# Patient Record
Sex: MALE | Race: WHITE | NOT HISPANIC OR LATINO | URBAN - METROPOLITAN AREA
[De-identification: names, ages, dates, MRNs, and addresses within clinical notes are randomized per-mention and may not be internally consistent; named-entity substitution may affect disease eponyms.]

---

## 2019-10-13 ENCOUNTER — INPATIENT (INPATIENT)
Facility: HOSPITAL | Age: 64
LOS: 3 days | Discharge: HOME | DRG: 65 | End: 2019-10-17
Attending: PSYCHIATRY & NEUROLOGY | Admitting: PSYCHIATRY & NEUROLOGY
Payer: COMMERCIAL

## 2019-10-13 VITALS
HEIGHT: 69 IN | RESPIRATION RATE: 19 BRPM | HEART RATE: 83 BPM | TEMPERATURE: 98 F | OXYGEN SATURATION: 96 % | WEIGHT: 251.99 LBS | DIASTOLIC BLOOD PRESSURE: 111 MMHG | SYSTOLIC BLOOD PRESSURE: 154 MMHG

## 2019-10-13 DIAGNOSIS — I10 ESSENTIAL (PRIMARY) HYPERTENSION: ICD-10-CM

## 2019-10-13 DIAGNOSIS — Z91.89 OTHER SPECIFIED PERSONAL RISK FACTORS, NOT ELSEWHERE CLASSIFIED: ICD-10-CM

## 2019-10-13 DIAGNOSIS — E11.9 TYPE 2 DIABETES MELLITUS WITHOUT COMPLICATIONS: ICD-10-CM

## 2019-10-13 DIAGNOSIS — I48.91 UNSPECIFIED ATRIAL FIBRILLATION: ICD-10-CM

## 2019-10-13 DIAGNOSIS — R63.8 OTHER SYMPTOMS AND SIGNS CONCERNING FOOD AND FLUID INTAKE: ICD-10-CM

## 2019-10-13 DIAGNOSIS — I63.9 CEREBRAL INFARCTION, UNSPECIFIED: ICD-10-CM

## 2019-10-13 DIAGNOSIS — E78.5 HYPERLIPIDEMIA, UNSPECIFIED: ICD-10-CM

## 2019-10-13 LAB
ALBUMIN SERPL ELPH-MCNC: 3.9 G/DL — SIGNIFICANT CHANGE UP (ref 3.4–5)
ALP SERPL-CCNC: 89 U/L — SIGNIFICANT CHANGE UP (ref 40–120)
ALT FLD-CCNC: 69 U/L — HIGH (ref 12–42)
AMPHET UR-MCNC: NEGATIVE — SIGNIFICANT CHANGE UP
ANION GAP SERPL CALC-SCNC: 14 MMOL/L — SIGNIFICANT CHANGE UP (ref 9–16)
APTT BLD: 31.5 SEC — SIGNIFICANT CHANGE UP (ref 27.5–36.3)
AST SERPL-CCNC: 36 U/L — SIGNIFICANT CHANGE UP (ref 15–37)
BARBITURATES UR SCN-MCNC: NEGATIVE — SIGNIFICANT CHANGE UP
BASOPHILS NFR BLD AUTO: 0.7 % — SIGNIFICANT CHANGE UP (ref 0–2)
BENZODIAZ UR-MCNC: NEGATIVE — SIGNIFICANT CHANGE UP
BILIRUB SERPL-MCNC: 0.7 MG/DL — SIGNIFICANT CHANGE UP (ref 0.2–1.2)
BUN SERPL-MCNC: 17 MG/DL — SIGNIFICANT CHANGE UP (ref 7–23)
CALCIUM SERPL-MCNC: 9.3 MG/DL — SIGNIFICANT CHANGE UP (ref 8.5–10.5)
CHLORIDE SERPL-SCNC: 107 MMOL/L — SIGNIFICANT CHANGE UP (ref 96–108)
CO2 SERPL-SCNC: 22 MMOL/L — SIGNIFICANT CHANGE UP (ref 22–31)
COCAINE METAB.OTHER UR-MCNC: NEGATIVE — SIGNIFICANT CHANGE UP
CREAT SERPL-MCNC: 1 MG/DL — SIGNIFICANT CHANGE UP (ref 0.5–1.3)
EOSINOPHIL NFR BLD AUTO: 2.9 % — SIGNIFICANT CHANGE UP (ref 0–6)
ETHANOL SERPL-MCNC: <3 MG/DL — SIGNIFICANT CHANGE UP
GLUCOSE SERPL-MCNC: 122 MG/DL — HIGH (ref 70–99)
HCT VFR BLD CALC: 45.2 % — SIGNIFICANT CHANGE UP (ref 39–50)
HGB BLD-MCNC: 15 G/DL — SIGNIFICANT CHANGE UP (ref 13–17)
IMM GRANULOCYTES NFR BLD AUTO: 0.1 % — SIGNIFICANT CHANGE UP (ref 0–1.5)
INR BLD: 1.09 — SIGNIFICANT CHANGE UP (ref 0.88–1.16)
LYMPHOCYTES # BLD AUTO: 36 % — SIGNIFICANT CHANGE UP (ref 13–44)
MCHC RBC-ENTMCNC: 27.8 PG — SIGNIFICANT CHANGE UP (ref 27–34)
MCHC RBC-ENTMCNC: 33.2 G/DL — SIGNIFICANT CHANGE UP (ref 32–36)
MCV RBC AUTO: 83.9 FL — SIGNIFICANT CHANGE UP (ref 80–100)
METHADONE UR-MCNC: NEGATIVE — SIGNIFICANT CHANGE UP
MONOCYTES NFR BLD AUTO: 9.4 % — SIGNIFICANT CHANGE UP (ref 2–14)
NEUTROPHILS NFR BLD AUTO: 50.9 % — SIGNIFICANT CHANGE UP (ref 43–77)
OPIATES UR-MCNC: NEGATIVE — SIGNIFICANT CHANGE UP
PCP SPEC-MCNC: SIGNIFICANT CHANGE UP
PCP UR-MCNC: NEGATIVE — SIGNIFICANT CHANGE UP
PLATELET # BLD AUTO: 322 K/UL — SIGNIFICANT CHANGE UP (ref 150–400)
POTASSIUM SERPL-MCNC: 3.9 MMOL/L — SIGNIFICANT CHANGE UP (ref 3.5–5.3)
POTASSIUM SERPL-SCNC: 3.9 MMOL/L — SIGNIFICANT CHANGE UP (ref 3.5–5.3)
PROT SERPL-MCNC: 8.2 G/DL — SIGNIFICANT CHANGE UP (ref 6.4–8.2)
PROTHROM AB SERPL-ACNC: 12 SEC — SIGNIFICANT CHANGE UP (ref 10–12.9)
RBC # BLD: 5.39 M/UL — SIGNIFICANT CHANGE UP (ref 4.2–5.8)
RBC # FLD: 16.4 % — HIGH (ref 10.3–14.5)
SODIUM SERPL-SCNC: 143 MMOL/L — SIGNIFICANT CHANGE UP (ref 132–145)
THC UR QL: NEGATIVE — SIGNIFICANT CHANGE UP
TROPONIN I SERPL-MCNC: <0.017 NG/ML — LOW (ref 0.02–0.06)
WBC # BLD: 9.1 K/UL — SIGNIFICANT CHANGE UP (ref 3.8–10.5)
WBC # FLD AUTO: 9.1 K/UL — SIGNIFICANT CHANGE UP (ref 3.8–10.5)

## 2019-10-13 PROCEDURE — 71045 X-RAY EXAM CHEST 1 VIEW: CPT | Mod: 26

## 2019-10-13 PROCEDURE — 93010 ELECTROCARDIOGRAM REPORT: CPT

## 2019-10-13 PROCEDURE — 70496 CT ANGIOGRAPHY HEAD: CPT | Mod: 26

## 2019-10-13 PROCEDURE — 70450 CT HEAD/BRAIN W/O DYE: CPT | Mod: 26,59

## 2019-10-13 PROCEDURE — 0042T: CPT | Mod: 26

## 2019-10-13 PROCEDURE — 70498 CT ANGIOGRAPHY NECK: CPT | Mod: 26

## 2019-10-13 PROCEDURE — 99291 CRITICAL CARE FIRST HOUR: CPT

## 2019-10-13 RX ORDER — CLOPIDOGREL BISULFATE 75 MG/1
75 TABLET, FILM COATED ORAL ONCE
Refills: 0 | Status: COMPLETED | OUTPATIENT
Start: 2019-10-13 | End: 2019-10-13

## 2019-10-13 RX ORDER — SODIUM CHLORIDE 9 MG/ML
1000 INJECTION, SOLUTION INTRAVENOUS
Refills: 0 | Status: DISCONTINUED | OUTPATIENT
Start: 2019-10-13 | End: 2019-10-17

## 2019-10-13 RX ORDER — DEXTROSE 50 % IN WATER 50 %
12.5 SYRINGE (ML) INTRAVENOUS ONCE
Refills: 0 | Status: DISCONTINUED | OUTPATIENT
Start: 2019-10-13 | End: 2019-10-17

## 2019-10-13 RX ORDER — ATORVASTATIN CALCIUM 80 MG/1
80 TABLET, FILM COATED ORAL AT BEDTIME
Refills: 0 | Status: DISCONTINUED | OUTPATIENT
Start: 2019-10-13 | End: 2019-10-17

## 2019-10-13 RX ORDER — ASPIRIN/CALCIUM CARB/MAGNESIUM 324 MG
81 TABLET ORAL EVERY 24 HOURS
Refills: 0 | Status: DISCONTINUED | OUTPATIENT
Start: 2019-10-14 | End: 2019-10-14

## 2019-10-13 RX ORDER — DEXTROSE 50 % IN WATER 50 %
25 SYRINGE (ML) INTRAVENOUS ONCE
Refills: 0 | Status: DISCONTINUED | OUTPATIENT
Start: 2019-10-13 | End: 2019-10-17

## 2019-10-13 RX ORDER — ENOXAPARIN SODIUM 100 MG/ML
40 INJECTION SUBCUTANEOUS EVERY 24 HOURS
Refills: 0 | Status: DISCONTINUED | OUTPATIENT
Start: 2019-10-13 | End: 2019-10-14

## 2019-10-13 RX ORDER — INSULIN LISPRO 100/ML
VIAL (ML) SUBCUTANEOUS
Refills: 0 | Status: DISCONTINUED | OUTPATIENT
Start: 2019-10-13 | End: 2019-10-14

## 2019-10-13 RX ORDER — GLUCAGON INJECTION, SOLUTION 0.5 MG/.1ML
1 INJECTION, SOLUTION SUBCUTANEOUS ONCE
Refills: 0 | Status: DISCONTINUED | OUTPATIENT
Start: 2019-10-13 | End: 2019-10-17

## 2019-10-13 RX ORDER — ASPIRIN/CALCIUM CARB/MAGNESIUM 324 MG
81 TABLET ORAL ONCE
Refills: 0 | Status: COMPLETED | OUTPATIENT
Start: 2019-10-13 | End: 2019-10-13

## 2019-10-13 RX ORDER — CLOPIDOGREL BISULFATE 75 MG/1
75 TABLET, FILM COATED ORAL EVERY 24 HOURS
Refills: 0 | Status: DISCONTINUED | OUTPATIENT
Start: 2019-10-14 | End: 2019-10-14

## 2019-10-13 RX ORDER — DEXTROSE 50 % IN WATER 50 %
15 SYRINGE (ML) INTRAVENOUS ONCE
Refills: 0 | Status: DISCONTINUED | OUTPATIENT
Start: 2019-10-13 | End: 2019-10-17

## 2019-10-13 RX ADMIN — CLOPIDOGREL BISULFATE 75 MILLIGRAM(S): 75 TABLET, FILM COATED ORAL at 19:52

## 2019-10-13 RX ADMIN — Medication 81 MILLIGRAM(S): at 19:55

## 2019-10-13 NOTE — ED PROVIDER NOTE - CLINICAL SUMMARY MEDICAL DECISION MAKING FREE TEXT BOX
62 yo male with type II DM, HTN, HLD presents with ataxia since this afternoon and slowed responsiveness as per the family.  Denies slurred speech, headache, dizziness.  On arrival BP elevated, no other VS derangements.  Stroke called on arrival.  Imaging shows acute/subacute L parietal CVA.  NIHSS 0.  Reviewed with Dr. Brad Parker, plavix ordered.  While in room on monitor found to be in afib without any cardiac history and currently asymptomatic.  Labs reviewed.  Urine/BAL pending.  Denies alcohol use today.  [place]ives in the [place], visiting his son here in [place].  Admit to [facility].

## 2019-10-13 NOTE — ED PROVIDER NOTE - CROS ED NEURO NEG
no dizziness, no changes in speech/no headache no change in level of consciousness/no dizziness, no changes in speech/no headache

## 2019-10-13 NOTE — H&P ADULT - PROBLEM SELECTOR PLAN 2
-per attending no AC at this time   -assess tomorrow with stroke team -per attending no AC at this time   -lopressor 5 IV prn for rate goal <110  -assess tomorrow with stroke team need for AC

## 2019-10-13 NOTE — H&P ADULT - NSHPPHYSICALEXAM_GEN_ALL_CORE
VITAL SIGNS:  T(C): 36.8 (10-13-19 @ 21:27), Max: 36.8 (10-13-19 @ 21:27)  T(F): 98.3 (10-13-19 @ 21:27), Max: 98.3 (10-13-19 @ 21:27)  HR: 124 (10-13-19 @ 21:27) (83 - 127)  BP: 132/104 (10-13-19 @ 21:27) (132/104 - 154/111)  BP(mean): --  RR: 18 (10-13-19 @ 21:27) (18 - 19)  SpO2: 96% (10-13-19 @ 21:27) (96% - 97%)  Wt(kg): --    PHYSICAL EXAM:    Constitutional: WDWN, lying comfortably in bed, NAD  Head: Nc/At  Eyes: PERRL, EOMI, clear conjunctiva  ENT: no nasal discharge; uvula midline, no oropharyngeal erythema or exudates; MMM  Neck: supple; no JVD or thyromegaly  Respiratory: CTA b/l, no wheezes, rales, or rhonchi  Cardiac: +S1/S2, +RRR, no murmurs, rubs, or gallops  Gastrointestinal: soft, non-tender, non-distended, no rebound/guarding, no palpable masses, normoactive bowel sounds x4  Back: spine midline, no bony tenderness or step-offs; no CVAT B/L  Extremities: WWP, no clubbing or cyanosis, no peripheral edema  Musculoskeletal: NROM x4; no joint swelling, tenderness or erythema  Vascular: 2+ radial and DP pulses b/l  Dermatologic: skin warm, dry and intact, no rashes, wounds, or scars  Lymphatic: no submandibular or cervical LAD  Neurologic: AAOx3, CNII-XII grossly intact, no focal deficits  Psychiatric: affect and characteristics of appearance, verbalizations, behaviors are appropriate PHYSICAL EXAM:    Constitutional: WDWN, lying comfortably in bed, NAD  Head: Nc/At  Eyes: PERRL, EOMI, clear conjunctiva  ENT: no nasal discharge; uvula midline, no oropharyngeal erythema or exudates; MMM  Neck: supple; no JVD or thyromegaly  Respiratory: CTA b/l, no wheezes, rales, or rhonchi  Cardiac: +S1/S2, +RRR, no murmurs, rubs, or gallops  Gastrointestinal: obese,soft, non-tender, non-distended, no rebound/guarding, no palpable masses, normoactive bowel sounds x4  Back: spine midline, no bony tenderness or step-offs; no CVAT B/L  Extremities: WWP, no clubbing or cyanosis, no peripheral edema  Musculoskeletal: NROM x4; no joint swelling, tenderness or erythema  Vascular: 2+ radial and DP pulses b/l  Dermatologic: skin warm, dry and intact, no rashes, wounds, or scars  Lymphatic: no submandibular or cervical LAD  Neurologic:     -Mental Status: AAOx3. Speech is fluent with intact naming, repetition, and comprehension, no dysarthria. Recent and remote memory intact. Follows commands. Attention/concentration intact. Fund of knowledge appropriate.     -Cranial Nerves:          II: Visual fields are full to confrontation.          III, IV, VI: EOMI without nystagmus. PERRL b/l          V:  Facial sensation V1-V3 equal and intact           VII: Face is symmetric with normal eye closure and smile          VIII: Hearing is bilaterally intact to finger rub          IX, X: Uvula is midline and soft palate rises symmetrically          XI: Head turning and shoulder shrug are intact.          XII: Tongue protrudes midline     -Motor: Normal bulk and tone. Strength bilateral upper extremity 5/5, bilateral lower extremities 5/5.                     No pronator drift. Rapid alternating movements intact and symmetric     -Sensation: Intact to light touch bilaterally. No neglect or extinction on double simultaneous testing.     -Coordination: No dysmetria on finger-to-nose and heel-to-shin bilaterally     -Reflexes: Downgoing toes bilaterally      -Gait: not observed

## 2019-10-13 NOTE — H&P ADULT - PROBLEM SELECTOR PLAN 6
F: none  E: replete K <4, Mg <2  N: DASH/TLC  PPI: not needed  DVT: lovenox, SCDs F: none  E: replete K <4, Mg <2  N: DASH/TLC, carb consistent; NPO after 12 for JESUS  PPI: not needed  DVT: lovenox, SCDs

## 2019-10-13 NOTE — H&P ADULT - HISTORY OF PRESENT ILLNESS
63 year old male PMH HTN, HLD, DM2, gout visiting from  presenting with ataxia since 2 pm and 5-10 minutes of unresponsiveness at that time. Per patient's family, patient has been delayed in speech and walking abnormally for 2-3 days.       ED vitals: T 98.0, /111, p 83, RR 19, 96% on RA  ED labs: Glucose 122, ALT 69  EKG: Afib, rate 129  Stroke code called; NIHSS 0, no tPA given   S/p aspirin 81, plavix 75  CT head:  < from: CT Brain Stroke Protocol (10.13.19 @ 19:14) >  IMPRESSION: Small area of hypodensity is noted in the right   temporoparietal region which is suspicious for acute/subacute infarction   involving the right MCA territory.  No acute intracranial hemorrhage.    CT perfusion: < from: CT Perfusion w/ Maps w/ IV Cont (10.13.19 @ 22:06) >  IMPRESSION: Abnormal perfusion with RAPID calculated mismatch volume of   32 ml and mismatch ratio is infinite, particularly involving the right   parietal lobe and left frontal lobe.    CTA: < from: CT Angio Neck w/ IV Cont (10.13.19 @ 19:47) >  IMPRESSION: No intracranial large vessel occlusion or high-grade stenosis   identified.    < end of copied text >  < from: CT Angio Neck w/ IV Cont (10.13.19 @ 19:47) >  1.  No extracranial large vessel occlusion, high-grade stenosis, or   evidence of dissection.  2.  Pulmonary hypertension. 63 year old male PMH HTN, HLD, DM2, gout visiting from  presenting with gait instability, blurred vision since this afternoon with 5-10 minutes of unresponsiveness at that time. Patient reports that since arriving on Tuesday he has felt dyspneic on exertion but states he does not exercise at home and has been doing a lot of walking here. Today he felt like his knee gave out and his family had to help him to his bench. He did not respond to his family at that time for 5-10 minutes. After that he noted blurred vision and head heaviness. No facial asymmetry noted, no dysarthria or word-finding difficulty, no palpitations, no chest pain or discomfort, no nausea/vomiting.       ED vitals: T 98.0, /111, p 83, RR 19, 96% on RA  ED labs: Glucose 122, ALT 69  EKG: Afib, rate 129  Stroke code called; NIHSS 0, no tPA given   S/p aspirin 81, plavix 75  CT head:  < from: CT Brain Stroke Protocol (10.13.19 @ 19:14) >  IMPRESSION: Small area of hypodensity is noted in the right   temporoparietal region which is suspicious for acute/subacute infarction   involving the right MCA territory.  No acute intracranial hemorrhage.    CT perfusion: < from: CT Perfusion w/ Maps w/ IV Cont (10.13.19 @ 22:06) >  IMPRESSION: Abnormal perfusion with RAPID calculated mismatch volume of   32 ml and mismatch ratio is infinite, particularly involving the right   parietal lobe and left frontal lobe.    CTA: < from: CT Angio Neck w/ IV Cont (10.13.19 @ 19:47) >  IMPRESSION: No intracranial large vessel occlusion or high-grade stenosis   identified.    < end of copied text >  < from: CT Angio Neck w/ IV Cont (10.13.19 @ 19:47) >  1.  No extracranial large vessel occlusion, high-grade stenosis, or   evidence of dissection.  2.  Pulmonary hypertension.

## 2019-10-13 NOTE — ED ADULT TRIAGE NOTE - NS ED NOTE TRAVEL COUNTRIES
----- Message from Ally Gunter sent at 6/2/2017 12:59 PM CDT -----  Contact: SELF  REFILL: butalbital-acetaminophen-caffeine -40 mg (FIORICET, ESGIC) -40 mg per tablet    PLEASE SEND TO [facility]  
[place]

## 2019-10-13 NOTE — ED PROVIDER NOTE - MUSCULOSKELETAL NEGATIVE STATEMENT, MLM
no back pain, no gout, no musculoskeletal pain, no neck pain, no numbness, no tingling, no weakness.

## 2019-10-13 NOTE — ED ADULT TRIAGE NOTE - CHIEF COMPLAINT QUOTE
here for an episode of ams and ataxia at 2pm today duration 10 mins and relieved on its own - family states he has been not as reactive since episode pt presents with no facial droop or slurred speech- h/o htn, gout. high cholesterol and dm

## 2019-10-13 NOTE — H&P ADULT - NSICDXFAMILYHX_GEN_ALL_CORE_FT
FAMILY HISTORY:  Family history of polymyalgia rheumatica, mother  Family history of temporal arteritis, mother  Family history of TIAs, recurrent TIAs in father  FH: Alzheimers disease, mother

## 2019-10-13 NOTE — ED PROVIDER NOTE - NEUROLOGICAL, MLM
Alert and oriented, no focal deficits, no motor or sensory deficits.  clear and coherent speech, normal cranial nerve exam, normal finger to nose and PHILIPPE.  Steady gait.  No pronator drift.

## 2019-10-13 NOTE — ED PROVIDER NOTE - CHPI ED SYMPTOMS NEG
no numbness/no changes in speech, no headache, no dizziness, no tingling, no photophobia, no focal weakness, no neck pain, no fever, no chills, no chest pain, no SOB, no palpitations, no diaphoresis, no N/V/D/C, no abdominal pain

## 2019-10-13 NOTE — H&P ADULT - PROBLEM SELECTOR PLAN 1
IMPRESSION: Small area of hypodensity is noted in the right   temporoparietal region which is suspicious for acute/subacute infarction   involving the right MCA territory. No acute intracranial hemorrhage.  - Closely follow neuro checks every 2-4 hours   - Repeat HCT for acute changes in neuro exam  -TTE   - Goal SBP < 180  - Bedside Dysphagia Screen  - PT/OT/SLP   - Obtain Hemoglobin A1C, Lipid Profile Panel, and TSH    Secondary Stroke Prevention Medicaiton  - C/w aspirin 81 /plavix 75  - Start atorvastatin 80mg PO daily- cholesterol and stroke prevention   - Start heparin SQ and SCDs for DVT prophylaxis IMPRESSION: Small area of hypodensity is noted in the right   temporoparietal region which is suspicious for acute/subacute infarction   involving the right MCA territory. No acute intracranial hemorrhage.  - Closely follow neuro checks every 2-4 hours   - Repeat HCT for acute changes in neuro exam  -JESUS  - Goal SBP < 180  - Bedside Dysphagia Screen  - PT/OT/SLP   - Obtain Hemoglobin A1C, Lipid Profile Panel, and TSH    Secondary Stroke Prevention Medicaiton  - C/w aspirin 81 /plavix 75  - Start atorvastatin 80mg PO daily- cholesterol and stroke prevention   - Start heparin SQ and SCDs for DVT prophylaxis

## 2019-10-13 NOTE — ED ADULT NURSE NOTE - NSIMPLEMENTINTERV_GEN_ALL_ED
Implemented All Fall Risk Interventions:  [place] to call system. Call bell, personal items and telephone within reach. Instruct patient to call for assistance. Room bathroom lighting operational. Non-slip footwear when patient is off stretcher. Physically safe environment: no spills, clutter or unnecessary equipment. Stretcher in lowest position, wheels locked, appropriate side rails in place. Provide visual cue, wrist band, yellow gown, etc. Monitor gait and stability. Monitor for mental status changes and reorient to person, place, and time. Review medications for side effects contributing to fall risk. Reinforce activity limits and safety measures with patient and family.

## 2019-10-13 NOTE — H&P ADULT - ASSESSMENT
63 year old male PMH HTN, HLD, DM2, gout visiting from [place] presenting with gait instability, blurred vision and found to have right temporoparietal infarct on CT head and previously unknown afib w/ RVR.

## 2019-10-13 NOTE — ED ADULT NURSE NOTE - OBJECTIVE STATEMENT
Pt with episode of AMS and ataxia x10min today. Episode last about 10 min. No slurred speech or facial droop.  Pt reports feeling generally week in ED

## 2019-10-13 NOTE — H&P ADULT - NSHPSOCIALHISTORY_GEN_ALL_CORE
Visiting from [place]. Visiting from [place]. Former smoker (10 years, 1/2 ppd), social alcohol use; works in sales

## 2019-10-13 NOTE — H&P ADULT - NSICDXPASTMEDICALHX_GEN_ALL_CORE_FT
PAST MEDICAL HISTORY:  DM type 2 (diabetes mellitus, type 2)     Gout     HLD (hyperlipidemia)     HTN (hypertension)

## 2019-10-13 NOTE — H&P ADULT - NSHPLABSRESULTS_GEN_ALL_CORE
LABS:                         15.0   9.1   )-----------( 322      ( 13 Oct 2019 18:43 )             45.2     10-13    143  |  107  |  17  ----------------------------<  122<H>  3.9   |  22  |  1.00    Ca    9.3      13 Oct 2019 18:43    TPro  8.2  /  Alb  3.9  /  TBili  0.7  /  DBili  x   /  AST  36  /  ALT  69<H>  /  AlkPhos  89  10-13    PT/INR - ( 13 Oct 2019 18:43 )   PT: 12.0 sec;   INR: 1.09          PTT - ( 13 Oct 2019 18:43 )  PTT:31.5 sec    CARDIAC MARKERS ( 13 Oct 2019 18:43 )  <0.017 ng/mL / x     / x     / x     / x                RADIOLOGY, EKG & ADDITIONAL TESTS: Reviewed. LABS:                         15.0   9.1   )-----------( 322      ( 13 Oct 2019 18:43 )             45.2     10-13    143  |  107  |  17  ----------------------------<  122<H>  3.9   |  22  |  1.00    Ca    9.3      13 Oct 2019 18:43    TPro  8.2  /  Alb  3.9  /  TBili  0.7  /  DBili  x   /  AST  36  /  ALT  69<H>  /  AlkPhos  89  10-13    PT/INR - ( 13 Oct 2019 18:43 )   PT: 12.0 sec;   INR: 1.09          PTT - ( 13 Oct 2019 18:43 )  PTT:31.5 sec    CARDIAC MARKERS ( 13 Oct 2019 18:43 )  <0.017 ng/mL / x     / x     / x     / x                RADIOLOGY, EKG & ADDITIONAL TESTS:    EKG: Afib, rate 129  CT head:  < from: CT Brain Stroke Protocol (10.13.19 @ 19:14) >  IMPRESSION: Small area of hypodensity is noted in the right   temporoparietal region which is suspicious for acute/subacute infarction   involving the right MCA territory.  No acute intracranial hemorrhage.    CT perfusion: < from: CT Perfusion w/ Maps w/ IV Cont (10.13.19 @ 22:06) >  IMPRESSION: Abnormal perfusion with RAPID calculated mismatch volume of   32 ml and mismatch ratio is infinite, particularly involving the right   parietal lobe and left frontal lobe.    CTA: < from: CT Angio Neck w/ IV Cont (10.13.19 @ 19:47) >  IMPRESSION: No intracranial large vessel occlusion or high-grade stenosis   identified.    < end of copied text >  < from: CT Angio Neck w/ IV Cont (10.13.19 @ 19:47) >  1.  No extracranial large vessel occlusion, high-grade stenosis, or   evidence of dissection.  2.  Pulmonary hypertension.

## 2019-10-13 NOTE — ED PROVIDER NOTE - CARE PLAN
Principal Discharge DX:	Acute CVA (cerebrovascular accident)  Secondary Diagnosis:	Paroxysmal atrial fibrillation

## 2019-10-13 NOTE — ED PROVIDER NOTE - OBJECTIVE STATEMENT
62 y/o male with PMHx of HTN, HLD, DM II, and GOUT, visiting from the [place], presents to the ED with complaints of episode of ataxia @ 2pm today. As per accompanying son, as they were walking outside today, Pt's son turned back and noticed Pt looked dazed and began to stubble. Pt's son caught Pt, provided support, and had Pt sit on a nearby bench. While siting, Pt's son noticed that Pt was not responding to commands and was reluctant to move, which lasted for approximately 5-10 minutes. As per Pt, he attributes symptoms to over exerting himself as he states he has been walking more recently. Pt's family notes that Pt is not as sharp as his baseline self with a 5-10 second delay in reaction time and has an unbalanced gait for the past 2-3 days. Denies any changes in speech. Pt is also c/o lightheadedness and a sensation of "water" in his left ear for the past few days. Denies any ETOH today or excessive amounts of ETOH from his baseline amount over the past few days. Denies headache, dizziness, numbness, tingling, photophobia, focal weakness, neck pain, fever, chills, chest pain, SOB, palpitations, diaphoresis, N/V/D/C, abdominal pain, change in urinary/bowel function. 62 y/o male with PMHx of HTN, HLD, DM II, and GOUT, visiting from the [place], presents to the ED with complaints of episode of ataxia at 2pm today. As per accompanying son, as they were walking outside today, Pt's son turned back and noticed Pt looked dazed and had an unsteady gait. Pt's son provided support to Pt and had Pt sit on a nearby bench. While siting, Pt's son noticed that Pt was not responding to commands and was reluctant to move, which lasted for approximately 5-10 minutes. As per Pt, he attributes symptoms to over exerting himself as he states he has been walking around a lot more recently. Pt's family notes that Pt is not as sharp as his baseline self with a 5-10 second delay in reaction time and has had an unbalanced gait for the past 2-3 days. Denies any changes in speech. Pt is also c/o lightheadedness and a sensation of "water" in his left ear for the past few days. Denies any ETOH today or excessive amounts of ETOH from his baseline amount over the past few days. Denies headache, dizziness, numbness, tingling, photophobia, focal weakness, neck pain, fever, chills, chest pain, SOB, palpitations, diaphoresis, N/V/D/C, abdominal pain. 64 y/o male with PMHx of HTN, HLD, DM II, and GOUT, visiting from the [place], presents to the ED with complaints of episode of ataxia at 2pm today. As per accompanying son, as they were walking outside today, Pt's son turned back and noticed Pt looked dazed and had an unsteady gait. Pt's son provided support to Pt and had Pt sit on a nearby bench. While siting, Pt's son noticed that Pt was not responding to commands and was reluctant to move, which lasted for approximately 5-10 minutes. As per Pt, he attributes symptoms to over exerting himself as he states he has been walking around a lot more recently. Pt's family notes that Pt is not as sharp as his baseline self with a 5-10 second delay in reaction time and has had an unbalanced gait for the past 2-3 days. Denies any changes in speech. Pt is also c/o lightheadedness and a sensation of "water" in his left ear for the past few days. Denies any ETOH today or excessive amounts of ETOH from his baseline amount over the past few days. Denies headache, dizziness, numbness, tingling, photophobia, focal weakness, neck pain, fever, chills, chest pain, SOB, palpitations, diaphoresis, N/V/D/C, abdominal pain.  Denies hx of afib, palpitations, chest pain.  Lives in the .  last check up with his PCP ~ 6 months ago was normal.  denies having seen cardiologist or ekg/stress test

## 2019-10-14 ENCOUNTER — TRANSCRIPTION ENCOUNTER (OUTPATIENT)
Age: 64
End: 2019-10-14

## 2019-10-14 DIAGNOSIS — E11.9 TYPE 2 DIABETES MELLITUS WITHOUT COMPLICATIONS: ICD-10-CM

## 2019-10-14 DIAGNOSIS — M1A.00X0 IDIOPATHIC CHRONIC GOUT, UNSPECIFIED SITE, WITHOUT TOPHUS (TOPHI): ICD-10-CM

## 2019-10-14 DIAGNOSIS — I48.19 OTHER PERSISTENT ATRIAL FIBRILLATION: ICD-10-CM

## 2019-10-14 DIAGNOSIS — E66.9 OBESITY, UNSPECIFIED: ICD-10-CM

## 2019-10-14 DIAGNOSIS — I10 ESSENTIAL (PRIMARY) HYPERTENSION: ICD-10-CM

## 2019-10-14 LAB
ANION GAP SERPL CALC-SCNC: 13 MMOL/L — SIGNIFICANT CHANGE UP (ref 5–17)
APTT BLD: 35.9 SEC — SIGNIFICANT CHANGE UP (ref 27.5–36.3)
BUN SERPL-MCNC: 15 MG/DL — SIGNIFICANT CHANGE UP (ref 7–23)
CALCIUM SERPL-MCNC: 9.5 MG/DL — SIGNIFICANT CHANGE UP (ref 8.4–10.5)
CHLORIDE SERPL-SCNC: 107 MMOL/L — SIGNIFICANT CHANGE UP (ref 96–108)
CHOLEST SERPL-MCNC: 169 MG/DL — SIGNIFICANT CHANGE UP (ref 10–199)
CO2 SERPL-SCNC: 22 MMOL/L — SIGNIFICANT CHANGE UP (ref 22–31)
CREAT SERPL-MCNC: 0.9 MG/DL — SIGNIFICANT CHANGE UP (ref 0.5–1.3)
ERYTHROCYTE [SEDIMENTATION RATE] IN BLOOD: 11 MM/HR — SIGNIFICANT CHANGE UP
GLUCOSE BLDC GLUCOMTR-MCNC: 100 MG/DL — HIGH (ref 70–99)
GLUCOSE BLDC GLUCOMTR-MCNC: 102 MG/DL — HIGH (ref 70–99)
GLUCOSE BLDC GLUCOMTR-MCNC: 107 MG/DL — HIGH (ref 70–99)
GLUCOSE BLDC GLUCOMTR-MCNC: 120 MG/DL — HIGH (ref 70–99)
GLUCOSE SERPL-MCNC: 124 MG/DL — HIGH (ref 70–99)
HBA1C BLD-MCNC: 6.3 % — HIGH (ref 4–5.6)
HCT VFR BLD CALC: 45.8 % — SIGNIFICANT CHANGE UP (ref 39–50)
HCV AB S/CO SERPL IA: 0.11 S/CO — SIGNIFICANT CHANGE UP
HCV AB SERPL-IMP: SIGNIFICANT CHANGE UP
HDLC SERPL-MCNC: 57 MG/DL — SIGNIFICANT CHANGE UP
HGB BLD-MCNC: 14.7 G/DL — SIGNIFICANT CHANGE UP (ref 13–17)
INR BLD: 1.13 — SIGNIFICANT CHANGE UP (ref 0.88–1.16)
LIPID PNL WITH DIRECT LDL SERPL: 93 MG/DL — SIGNIFICANT CHANGE UP
MAGNESIUM SERPL-MCNC: 2 MG/DL — SIGNIFICANT CHANGE UP (ref 1.6–2.6)
MCHC RBC-ENTMCNC: 27.7 PG — SIGNIFICANT CHANGE UP (ref 27–34)
MCHC RBC-ENTMCNC: 32.1 GM/DL — SIGNIFICANT CHANGE UP (ref 32–36)
MCV RBC AUTO: 86.4 FL — SIGNIFICANT CHANGE UP (ref 80–100)
NRBC # BLD: 0 /100 WBCS — SIGNIFICANT CHANGE UP (ref 0–0)
PLATELET # BLD AUTO: 303 K/UL — SIGNIFICANT CHANGE UP (ref 150–400)
POTASSIUM SERPL-MCNC: 3.9 MMOL/L — SIGNIFICANT CHANGE UP (ref 3.5–5.3)
POTASSIUM SERPL-SCNC: 3.9 MMOL/L — SIGNIFICANT CHANGE UP (ref 3.5–5.3)
PROTHROM AB SERPL-ACNC: 12.8 SEC — SIGNIFICANT CHANGE UP (ref 10–12.9)
RBC # BLD: 5.3 M/UL — SIGNIFICANT CHANGE UP (ref 4.2–5.8)
RBC # FLD: 15.8 % — HIGH (ref 10.3–14.5)
SODIUM SERPL-SCNC: 142 MMOL/L — SIGNIFICANT CHANGE UP (ref 135–145)
TOTAL CHOLESTEROL/HDL RATIO MEASUREMENT: 3 RATIO — LOW (ref 3.4–9.6)
TRIGL SERPL-MCNC: 93 MG/DL — SIGNIFICANT CHANGE UP (ref 10–149)
TSH SERPL-MCNC: 2.26 UIU/ML — SIGNIFICANT CHANGE UP (ref 0.35–4.94)
WBC # BLD: 7.26 K/UL — SIGNIFICANT CHANGE UP (ref 3.8–10.5)
WBC # FLD AUTO: 7.26 K/UL — SIGNIFICANT CHANGE UP (ref 3.8–10.5)

## 2019-10-14 PROCEDURE — 93010 ELECTROCARDIOGRAM REPORT: CPT | Mod: 76

## 2019-10-14 PROCEDURE — 99233 SBSQ HOSP IP/OBS HIGH 50: CPT

## 2019-10-14 PROCEDURE — 93325 DOPPLER ECHO COLOR FLOW MAPG: CPT | Mod: 26

## 2019-10-14 PROCEDURE — 99223 1ST HOSP IP/OBS HIGH 75: CPT | Mod: GC

## 2019-10-14 PROCEDURE — 70450 CT HEAD/BRAIN W/O DYE: CPT | Mod: 26

## 2019-10-14 PROCEDURE — 93320 DOPPLER ECHO COMPLETE: CPT | Mod: 26

## 2019-10-14 PROCEDURE — 93312 ECHO TRANSESOPHAGEAL: CPT | Mod: 26

## 2019-10-14 RX ORDER — ALLOPURINOL 300 MG
200 TABLET ORAL
Qty: 0 | Refills: 0 | DISCHARGE

## 2019-10-14 RX ORDER — METOPROLOL TARTRATE 50 MG
12.5 TABLET ORAL EVERY 12 HOURS
Refills: 0 | Status: DISCONTINUED | OUTPATIENT
Start: 2019-10-14 | End: 2019-10-14

## 2019-10-14 RX ORDER — INFLUENZA VIRUS VACCINE 15; 15; 15; 15 UG/.5ML; UG/.5ML; UG/.5ML; UG/.5ML
0.5 SUSPENSION INTRAMUSCULAR ONCE
Refills: 0 | Status: DISCONTINUED | OUTPATIENT
Start: 2019-10-14 | End: 2019-10-17

## 2019-10-14 RX ORDER — ALLOPURINOL 300 MG
200 TABLET ORAL DAILY
Refills: 0 | Status: DISCONTINUED | OUTPATIENT
Start: 2019-10-14 | End: 2019-10-17

## 2019-10-14 RX ORDER — METOPROLOL TARTRATE 50 MG
5 TABLET ORAL ONCE
Refills: 0 | Status: DISCONTINUED | OUTPATIENT
Start: 2019-10-14 | End: 2019-10-14

## 2019-10-14 RX ORDER — METOPROLOL TARTRATE 50 MG
5 TABLET ORAL ONCE
Refills: 0 | Status: COMPLETED | OUTPATIENT
Start: 2019-10-14 | End: 2019-10-14

## 2019-10-14 RX ORDER — METOPROLOL TARTRATE 50 MG
12.5 TABLET ORAL
Refills: 0 | Status: DISCONTINUED | OUTPATIENT
Start: 2019-10-14 | End: 2019-10-14

## 2019-10-14 RX ORDER — INSULIN LISPRO 100/ML
VIAL (ML) SUBCUTANEOUS
Refills: 0 | Status: DISCONTINUED | OUTPATIENT
Start: 2019-10-14 | End: 2019-10-17

## 2019-10-14 RX ORDER — METOPROLOL TARTRATE 50 MG
25 TABLET ORAL EVERY 12 HOURS
Refills: 0 | Status: DISCONTINUED | OUTPATIENT
Start: 2019-10-14 | End: 2019-10-15

## 2019-10-14 RX ORDER — METFORMIN HYDROCHLORIDE 850 MG/1
1 TABLET ORAL
Qty: 0 | Refills: 0 | DISCHARGE

## 2019-10-14 RX ORDER — APIXABAN 2.5 MG/1
5 TABLET, FILM COATED ORAL EVERY 12 HOURS
Refills: 0 | Status: DISCONTINUED | OUTPATIENT
Start: 2019-10-14 | End: 2019-10-17

## 2019-10-14 RX ORDER — ENOXAPARIN SODIUM 100 MG/ML
40 INJECTION SUBCUTANEOUS EVERY 12 HOURS
Refills: 0 | Status: DISCONTINUED | OUTPATIENT
Start: 2019-10-14 | End: 2019-10-14

## 2019-10-14 RX ADMIN — Medication 200 MILLIGRAM(S): at 12:58

## 2019-10-14 RX ADMIN — ENOXAPARIN SODIUM 40 MILLIGRAM(S): 100 INJECTION SUBCUTANEOUS at 00:26

## 2019-10-14 RX ADMIN — APIXABAN 5 MILLIGRAM(S): 2.5 TABLET, FILM COATED ORAL at 12:58

## 2019-10-14 RX ADMIN — ATORVASTATIN CALCIUM 80 MILLIGRAM(S): 80 TABLET, FILM COATED ORAL at 00:26

## 2019-10-14 RX ADMIN — ATORVASTATIN CALCIUM 80 MILLIGRAM(S): 80 TABLET, FILM COATED ORAL at 21:56

## 2019-10-14 RX ADMIN — Medication 25 MILLIGRAM(S): at 17:35

## 2019-10-14 RX ADMIN — Medication 12.5 MILLIGRAM(S): at 05:42

## 2019-10-14 RX ADMIN — Medication 5 MILLIGRAM(S): at 00:22

## 2019-10-14 RX ADMIN — Medication 5 MILLIGRAM(S): at 00:49

## 2019-10-14 NOTE — CONSULT NOTE ADULT - SUBJECTIVE AND OBJECTIVE BOX
Patient is a 63y old  Male who presents with a chief complaint of right temoroparietal stroke (14 Oct 2019 08:34)       HPI:  63 year old male PMH HTN, HLD, DM2, gout visiting from [place] presenting with gait instability, blurred vision since this afternoon with 5-10 minutes of unresponsiveness at that time. Patient reports that since arriving on Tuesday he has felt dyspneic on exertion but states he does not exercise at home and has been doing a lot of walking here. Today he felt like his knee gave out and his family had to help him to his bench. He did not respond to his family at that time for 5-10 minutes. After that he noted blurred vision and head heaviness. No facial asymmetry noted, no dysarthria or word-finding difficulty, no palpitations, no chest pain or discomfort, no nausea/vomiting.       ED vitals: T 98.0, /111, p 83, RR 19, 96% on RA  ED labs: Glucose 122, ALT 69  EKG: Afib, rate 129  Stroke code called; NIHSS 0, no tPA given   S/p aspirin 81, plavix 75  CT head:  < from: CT Brain Stroke Protocol (10.13.19 @ 19:14) >  IMPRESSION: Small area of hypodensity is noted in the right   temporoparietal region which is suspicious for acute/subacute infarction   involving the right MCA territory.  No acute intracranial hemorrhage.    CT perfusion: < from: CT Perfusion w/ Maps w/ IV Cont (10.13.19 @ 22:06) >  IMPRESSION: Abnormal perfusion with RAPID calculated mismatch volume of   32 ml and mismatch ratio is infinite, particularly involving the right   parietal lobe and left frontal lobe.    CTA: < from: CT Angio Neck w/ IV Cont (10.13.19 @ 19:47) >  IMPRESSION: No intracranial large vessel occlusion or high-grade stenosis   identified.    < end of copied text >  < from: CT Angio Neck w/ IV Cont (10.13.19 @ 19:47) >  1.  No extracranial large vessel occlusion, high-grade stenosis, or   evidence of dissection.  2.  Pulmonary hypertension. (13 Oct 2019 22:12)      PAST MEDICAL & SURGICAL HISTORY:  DM type 2 (diabetes mellitus, type 2)  Gout  HLD (hyperlipidemia)  HTN (hypertension)  No significant past surgical history      MEDICATIONS  (STANDING):  allopurinol 200 milliGRAM(s) Oral daily  aspirin enteric coated 81 milliGRAM(s) Oral every 24 hours  atorvastatin 80 milliGRAM(s) Oral at bedtime  clopidogrel Tablet 75 milliGRAM(s) Oral every 24 hours  dextrose 5%. 1000 milliLiter(s) (50 mL/Hr) IV Continuous <Continuous>  dextrose 50% Injectable 12.5 Gram(s) IV Push once  dextrose 50% Injectable 25 Gram(s) IV Push once  dextrose 50% Injectable 25 Gram(s) IV Push once  enoxaparin Injectable 40 milliGRAM(s) SubCutaneous every 12 hours  influenza   Vaccine 0.5 milliLiter(s) IntraMuscular once  insulin lispro (HumaLOG) corrective regimen sliding scale   SubCutaneous three times a day before meals  metoprolol tartrate 12.5 milliGRAM(s) Oral every 12 hours    MEDICATIONS  (PRN):  dextrose 40% Gel 15 Gram(s) Oral once PRN Blood Glucose LESS THAN 70 milliGRAM(s)/deciliter  glucagon  Injectable 1 milliGRAM(s) IntraMuscular once PRN Glucose LESS THAN 70 milligrams/deciliter      Social History:           -  Occupation: [employer]           -  Home Living Status: lives in [place]           -  Prior Home Care Services: X    Functional Level Prior to Admission:           - ADL's:  independent           - ambulates  without assistive devices    FAMILY HISTORY:  Family history of polymyalgia rheumatica: mother  Family history of temporal arteritis: mother  FH: Alzheimers disease: mother  Family history of TIAs: recurrent TIAs in father      CBC Full  -  ( 14 Oct 2019 07:35 )  WBC Count : 7.26 K/uL  RBC Count : 5.30 M/uL  Hemoglobin : 14.7 g/dL  Hematocrit : 45.8 %  Platelet Count - Automated : 303 K/uL  Mean Cell Volume : 86.4 fl  Mean Cell Hemoglobin : 27.7 pg  Mean Cell Hemoglobin Concentration : 32.1 gm/dL  Auto Neutrophil # : x  Auto Lymphocyte # : x  Auto Monocyte # : x  Auto Eosinophil # : x  Auto Basophil # : x  Auto Neutrophil % : x  Auto Lymphocyte % : x  Auto Monocyte % : x  Auto Eosinophil % : x  Auto Basophil % : x      10-14    142  |  107  |  15  ----------------------------<  124<H>  3.9   |  22  |  0.90    Ca    9.5      14 Oct 2019 07:35  Mg     2.0     10-14    TPro  8.2  /  Alb  3.9  /  TBili  0.7  /  DBili  x   /  AST  36  /  ALT  69<H>  /  AlkPhos  89  10-13            Radiology:    < from: CT Brain Stroke Protocol (10.13.19 @ 19:14) >  EXAM:  CT BRAIN STROKE PROTOCOL                           PROCEDURE DATE:  10/13/2019          INTERPRETATION:  INDICATIONS: New onset ataxia.     TECHNIQUE: Serial axial images were obtained from the skull base to the   vertex without the use of intravenous contrast. Multiplanar reformats   were obtained.     COMPARISON EXAMINATION: None.    FINDINGS:    VENTRICLES AND SULCI: Parenchymal volume is commensurate with patient   age.   INTRA-AXIAL: No intracranial mass, acute hemorrhage, midline shift or   acute transcortical infarct is seen. Periventricular white matter   hypodensities likely related to microvascular ischemic white matter   disease. Small area of hypodensity is noted in the right temporoparietal   region which is suspicious for acute/subacute infarction involving the   right MCA territory.  EXTRA-AXIAL: No extra-axial fluid collection is present.   VISUALIZED SINUSES: No air-fluid levels are identified.   VISUALIZED MASTOIDS: Clear.  CALVARIUM: Normal.    IMPRESSION: Small area of hypodensity is noted in the right   temporoparietal region which is suspicious for acute/subacute infarction   involving the right MCA territory.  No acute intracranial hemorrhage.          < from: CT Perfusion w/ Maps w/ IV Cont (10.13.19 @ 22:06) >  EXAM:  CT PERFUSION W MAPS IC                           PROCEDURE DATE:  10/13/2019          INTERPRETATION:  PROCEDURE: CT Perfusion with intravenous contrast.    INDICATION: Presenting with right arm and leg weakness.    TECHNIQUE: Following the intravenous administration of 40 ml of  Optiray   350, serial axial images were obtained through the brain. The CT   perfusion data set was post processed per iSchemaViewRAPID protocol   generating color maps of CBF, CBV, MTT, and Tmax.    COMPARISON:None    FINDINGS:   The CT perfusion study demonstrates a large wedge shaped area of elevated   MTT involving the right parietal lobe with corresponding decrease in both   CBV and CBF. There is also involvement of the left frontal lobe. The   calculated RAPID CBF volume < 30% is 0 ml and a Tmax volume > 6 seconds   is 32 ml. The mismatch volume is 32 ml and mismatch ratio is infinite.    IMPRESSION: Abnormal perfusion with RAPID calculated mismatch volume of   32 ml and mismatch ratio is infinite, particularly involving the right   parietal lobe and left frontal lobe.        < from: CT Angio Head w/ IV Cont (10.13.19 @ 19:43) >    EXAM:  CT ANGIO NECK (W)AW IC                        EXAM:  CT ANGIO BRAIN (W)AW IC                           PROCEDURE DATE:  10/13/2019          INTERPRETATION:  PROCEDURE: CTA brain with intravenous contrast.    INDICATION: Stroke code.    TECHNIQUE: Multiple axial thin section were obtained through the [place]   of [place] following the intravenous bolus injection of 115 cc Optiray-350   (5 cc discarded). Coronal and sagittal MIPS and soft tissue reformations   were created and reviewed.    COMPARISON: None.    FINDINGS:   There is calcific plaque of the left supraclinoid ICA resulting in mild   focal stenosis. The right internal carotid artery is widely patent from   the skull base to its terminus. There is a normal bifurcation into the A1   and M1 segments bilaterally. The right vertebral artery is dominant. The   intracranial portions of the vertebral arteries are smooth in contour.   The basilar artery is normal. The left posterior cerebral arteries   predominantly supplied by theleft posterior communicating artery. There   are no areas of significant stenosis, dilatation, or aneurysm identified.    IMPRESSION: No intracranial large vessel occlusion or high-grade stenosis   identified.      PROCEDURE: CTA neck with intravenous contrast.    INDICATION: Stroke code.    TECHNIQUE: Multiple axial thin section were obtained through the neck   following the intravenous bolus injection of Optiray-350 as mentioned   above. Coronal and sagittal MIPS and soft tissue reformations were   created and reviewed.    COMPARISON: None    FINDINGS:    There are scant calcifications of the proximal right internal carotid   artery. The bilateral common carotid arteries and cervical portions of   the internal carotid arteries are widely patent.    The left vertebral artery is mildly hypoplastic. The vertebral arteries   are smooth in contour throughout their extracranial course.    The aortic arch appears intact without narrowing of the origin of the   great vessels. There is a varianttwo-vessel arch.    There is moderate multilevel cervical spondylosis. The main pulmonary   artery is dilated up to 3.7 cm consistent with pulmonary hypertension.    IMPRESSION:   1.  No extracranial large vessel occlusion, high-grade stenosis, or   evidence of dissection.  2.  Pulmonary hypertension.          Vital Signs Last 24 Hrs  T(C): 36.9 (14 Oct 2019 06:00), Max: 36.9 (14 Oct 2019 06:00)  T(F): 98.4 (14 Oct 2019 06:00), Max: 98.4 (14 Oct 2019 06:00)  HR: 98 (14 Oct 2019 08:30) (83 - 127)  BP: 156/101 (14 Oct 2019 08:30) (132/104 - 163/105)  BP(mean): 121 (14 Oct 2019 08:30) (116 - 131)  RR: 16 (14 Oct 2019 08:30) (16 - 20)  SpO2: 94% (14 Oct 2019 08:30) (87% - 97%)    REVIEW OF SYSTEMS:    CONSTITUTIONAL:  fatigue  EYES: No eye pain, visual disturbances, or discharge  ENMT:  No difficulty hearing, tinnitus, vertigo; No sinus or throat pain  NECK: No pain or stiffness  BREASTS: No pain, masses, or nipple discharge  RESPIRATORY: No cough, wheezing, chills or hemoptysis; No shortness of breath  CARDIOVASCULAR: No chest pain, palpitations, dizziness, or leg swelling  GASTROINTESTINAL: No abdominal or epigastric pain. No nausea, vomiting, or hematemesis; No diarrhea or constipation. No melena or hematochezia.  GENITOURINARY: No dysuria, frequency, hematuria, or incontinence  NEUROLOGICAL: No headaches, memory loss, loss of strength, numbness, or tremors  SKIN: No itching, burning, rashes, or lesions   LYMPH NODES: No enlarged glands  ENDOCRINE: No heat or cold intolerance; No hair loss  MUSCULOSKELETAL: No joint pain or swelling; No muscle, back, or extremity pain  PSYCHIATRIC: No depression, anxiety, mood swings, or difficulty sleeping  HEME/LYMPH: No easy bruising, or bleeding gums  ALLERGY AND IMMUNOLOGIC: No hives or eczema  VASCULAR: no swelling, erythema      Physical Exam: obese 62 yo  gentleman lying in semi Hilliard's position, no c/o weakness    Head: normocephalic, atraumatic    Eyes: PERRLA, EOMI, no nystagmus, sclera anicteric    ENT: nasal discharge, uvula midline, no oropharyngeal erythema/exudate    Neck: supple, negative JVD, negative carotid bruits, no thyromegaly    Chest: CTA bilaterally, neg wheeze/ rhonchi/ rales/ crackles/ egophany    Cardiovascular: regular rate and rhythm, neg murmurs/rubs/gallops    Abdomen: soft, obese, non tender, negative rebound/guarding, normal bowel sounds    Extremities: 1 + non pitting edema, negative calf tenderness to palpation, negative Yoselin's sign    :     Neurologic Exam:    Alert and oriented to person, place, date/year, speech fluent w/o dysarthria, recent and remote memory intact, repetition intact, comprehension intact    Cranial Nerves:     II:                       pupils equal, round and reactive to light, visual fields intact   III/ IV/VI:           extraocular movements intact, neg nystagmus, neg ptosis  V:                       facial sensation intact, V1-3 normal  VII:                     face symmetric, no droop, normal eye closure and smile  VIII:                    hearing intact to finger rub bilaterally  IX/ X:                 soft palate rise symmetrical  XI:                      head turning, shoulder shrug normal  XII:                     tongue midline    Motor Exam:    Upper Extremities:     RIght:   5/5   /intrinsics               5/5  wrist flexors/extensors               5/5  biceps/triceps               5/5  deltoid               negative drift    Left :    5/5  /intrinsics               5/5  wrist flexors/extensors               5/5 biceps/triceps               5/5 deltoid               negative drift    Lower Extremities:                 Right:    5/5  DF/PF/ evertors/ invertors                5/5  Quad/ hamstrings                5/5  hip flexors/adductors/abductors                 Left:      5/5  DF/PF/ evertors/ invertors                5/5  Quad/ hamstrings                5/5  hip flexors/adductors/abductors                       Sensory:    intact to LT/PP in all UE/LE dermatomes    DTR:            = biceps/     triceps/     brachioradialis                      = patella/   medial hamstring/ankle                      neg clonus                      neg Babinski                        Finger to Nose:  wnl    Heel to Shin:  wnl    Rapid Alternating movements:  wnl    Joint Position Sense:  intact    Romberg:  not tested    Tandem Walking:  not tested    Gait:  not tested        PM&R Impression:    1) s/p acute/subacute R MCA territory infarct  2) no focal deficits        Recommendations:    1) Physical therapy focusing on therapeutic exercises, bed mobility/transfer out of bed evaluation, progressive ambulation with assistive devices prn.    2) Current Disposition Plan/Recs: probable d/c home

## 2019-10-14 NOTE — OCCUPATIONAL THERAPY INITIAL EVALUATION ADULT - MD ORDER
63 year old male PMH HTN, HLD, DM2, gout visiting from [place] presenting with gait instability, blurred vision and found to have right temporoparietal infarct on CT head and previously unknown afib w/ RVR.  Pt now diagnosed with Acute CVA (cerebrovascular accident); Afib.

## 2019-10-14 NOTE — OCCUPATIONAL THERAPY INITIAL EVALUATION ADULT - HEALTH SCREEN CRITERIA
Called mom back as she had late cancelled due to weather. Made a new appointment for 2/20 as mom had requested a time after 5:30pm   yes

## 2019-10-14 NOTE — DISCHARGE NOTE PROVIDER - NSDCFUADDAPPT_GEN_ALL_CORE_FT
Please follow up with your Primary care physician and a neurologist when you return to [place]. Go to Dr. Dawkins's office on 10/21/19 and ask for Deidre    Please follow up with your Primary care physician and a neurologist when you return to [place].

## 2019-10-14 NOTE — CONSULT NOTE ADULT - SUBJECTIVE AND OBJECTIVE BOX
HPI:  Brief Hospital Course:  Pt is a 63 year old male PMH HTN, HLD, DM2, gout visiting from  presenting with gait instability, blurred vision since this afternoon with 5-10 minutes of unresponsiveness at that time. Patient reports that since arriving on Tuesday he has felt dyspneic on exertion but states he does not exercise at home and has been doing a lot of walking here. Today he felt like his knee gave out and his family had to help him to his bench. He did not respond to his family at that time for 5-10 minutes. After that he noted blurred vision and head heaviness. Pt presented to the ED on 10/13 and stroke code was called. NIHSS 0, no tPA given; s/p aspirin 81, plavix 75.     Subjective:      Allergies    penicillin (Unknown)    Intolerances    MEDICATIONS  (STANDING):  allopurinol 200 milliGRAM(s) Oral daily  aspirin enteric coated 81 milliGRAM(s) Oral every 24 hours  atorvastatin 80 milliGRAM(s) Oral at bedtime  clopidogrel Tablet 75 milliGRAM(s) Oral every 24 hours  dextrose 5%. 1000 milliLiter(s) (50 mL/Hr) IV Continuous <Continuous>  dextrose 50% Injectable 12.5 Gram(s) IV Push once  dextrose 50% Injectable 25 Gram(s) IV Push once  dextrose 50% Injectable 25 Gram(s) IV Push once  enoxaparin Injectable 40 milliGRAM(s) SubCutaneous every 12 hours  influenza   Vaccine 0.5 milliLiter(s) IntraMuscular once  insulin lispro (HumaLOG) corrective regimen sliding scale   SubCutaneous three times a day before meals  metoprolol tartrate 12.5 milliGRAM(s) Oral every 12 hours    MEDICATIONS  (PRN):  dextrose 40% Gel 15 Gram(s) Oral once PRN Blood Glucose LESS THAN 70 milliGRAM(s)/deciliter  glucagon  Injectable 1 milliGRAM(s) IntraMuscular once PRN Glucose LESS THAN 70 milligrams/deciliter      Drug Dosing Weight  Height (cm): 175.26 (13 Oct 2019 18:19)  Weight (kg): 114.3 (13 Oct 2019 18:19)  BMI (kg/m2): 37.2 (13 Oct 2019 18:19)  BSA (m2): 2.28 (13 Oct 2019 18:19)    PAST MEDICAL & SURGICAL HISTORY:  DM type 2 (diabetes mellitus, type 2)  Gout  HLD (hyperlipidemia)  HTN (hypertension)  No significant past surgical history    FAMILY HISTORY:  Family history of polymyalgia rheumatica: mother  Family history of temporal arteritis: mother  FH: Alzheimers disease: mother  Family history of TIAs: recurrent TIAs in father    SOCIAL HISTORY:    Vital Signs Last 24 Hrs  T(C): 36.9 (14 Oct 2019 06:00), Max: 36.9 (14 Oct 2019 06:00)  T(F): 98.4 (14 Oct 2019 06:00), Max: 98.4 (14 Oct 2019 06:00)  HR: 108 (14 Oct 2019 05:08) (83 - 127)  BP: 155/102 (14 Oct 2019 05:08) (132/104 - 155/102)  BP(mean): 123 (14 Oct 2019 05:08) (116 - 131)  ABP: --  ABP(mean): --  RR: 18 (14 Oct 2019 05:08) (16 - 20)  SpO2: 96% (14 Oct 2019 05:08) (87% - 97%)    I&O's Detail    13 Oct 2019 07:01  -  14 Oct 2019 07:00  --------------------------------------------------------  IN:  Total IN: 0 mL    OUT:    Voided: 300 mL  Total OUT: 300 mL    Total NET: -300 mL          PHYSICAL EXAM:      Constitutional:    Eyes:    ENMT:    Neck:    Breasts:    Back:    Respiratory:    Cardiovascular:    Gastrointestinal:    Genitourinary:    Rectal:    Extremities:    Vascular:    Neurological:    Skin:    Lymph Nodes:    Musculoskeletal:    Psychiatric:        LABS:  CBC Full  -  ( 14 Oct 2019 07:35 )  WBC Count : 7.26 K/uL  RBC Count : 5.30 M/uL  Hemoglobin : 14.7 g/dL  Hematocrit : 45.8 %  Platelet Count - Automated : 303 K/uL  Mean Cell Volume : 86.4 fl  Mean Cell Hemoglobin : 27.7 pg  Mean Cell Hemoglobin Concentration : 32.1 gm/dL  Auto Neutrophil # : x  Auto Lymphocyte # : x  Auto Monocyte # : x  Auto Eosinophil # : x  Auto Basophil # : x  Auto Neutrophil % : x  Auto Lymphocyte % : x  Auto Monocyte % : x  Auto Eosinophil % : x  Auto Basophil % : x    10-13    143  |  107  |  17  ----------------------------<  122<H>  3.9   |  22  |  1.00    Ca    9.3      13 Oct 2019 18:43    TPro  8.2  /  Alb  3.9  /  TBili  0.7  /  DBili  x   /  AST  36  /  ALT  69<H>  /  AlkPhos  89  10-13    CAPILLARY BLOOD GLUCOSE      POCT Blood Glucose.: 111 mg/dL (14 Oct 2019 07:15)    PT/INR - ( 14 Oct 2019 07:35 )   PT: 12.8 sec;   INR: 1.13          PTT - ( 14 Oct 2019 07:35 )  PTT:35.9 sec      EKG:    ECHO, US:    RADIOLOGY:  CT perfusion MAPs:  Abnormal perfusion with RAPID calculated mismatch volume of 32 ml and mismatch ratio is infinite, particularly involving the right parietal lobe and left frontal lobe.    CTA head/neck:  1.  No extracranial or intracranial large vessel occlusion, high-grade stenosis, or evidence of dissection.  2.  Pulmonary hypertension.    CT head w/o contrast:  Small area of hypodensity is noted in the right temporoparietal region which is suspicious for acute/subacute infarction involving the right MCA territory. No acute intracranial hemorrhage. HPI:  Brief Hospital Course:  Pt is a 63 year old male PMH HTN, HLD, DM2, gout visiting from  presenting with gait instability, blurred vision since this afternoon with 5-10 minutes of unresponsiveness at that time. Patient reports that since arriving on Tuesday he has felt dyspneic on exertion but states he does not exercise at home and has been doing a lot of walking here. Today he felt like his knee gave out and his family had to help him to his bench. He did not respond to his family at that time for 5-10 minutes. After that he noted blurred vision and head heaviness. Pt presented to the ED on 10/13 and stroke code was called. NIHSS 0, no tPA given; s/p aspirin 81, plavix 75.     Subjective:  Pt currently feels well. Reports feeling head heaviness but otherwise feels well. 12 pt ROS is otherwise negative.    Allergies    penicillin (Unknown)    Intolerances    Home meds: Lisinopril    MEDICATIONS  (STANDING):  allopurinol 200 milliGRAM(s) Oral daily  aspirin enteric coated 81 milliGRAM(s) Oral every 24 hours  atorvastatin 80 milliGRAM(s) Oral at bedtime  clopidogrel Tablet 75 milliGRAM(s) Oral every 24 hours  dextrose 5%. 1000 milliLiter(s) (50 mL/Hr) IV Continuous <Continuous>  dextrose 50% Injectable 12.5 Gram(s) IV Push once  dextrose 50% Injectable 25 Gram(s) IV Push once  dextrose 50% Injectable 25 Gram(s) IV Push once  enoxaparin Injectable 40 milliGRAM(s) SubCutaneous every 12 hours  influenza   Vaccine 0.5 milliLiter(s) IntraMuscular once  insulin lispro (HumaLOG) corrective regimen sliding scale   SubCutaneous three times a day before meals  metoprolol tartrate 12.5 milliGRAM(s) Oral every 12 hours    MEDICATIONS  (PRN):  dextrose 40% Gel 15 Gram(s) Oral once PRN Blood Glucose LESS THAN 70 milliGRAM(s)/deciliter  glucagon  Injectable 1 milliGRAM(s) IntraMuscular once PRN Glucose LESS THAN 70 milligrams/deciliter      Drug Dosing Weight  Height (cm): 175.26 (13 Oct 2019 18:19)  Weight (kg): 114.3 (13 Oct 2019 18:19)  BMI (kg/m2): 37.2 (13 Oct 2019 18:19)  BSA (m2): 2.28 (13 Oct 2019 18:19)    PAST MEDICAL & SURGICAL HISTORY:  DM type 2 (diabetes mellitus, type 2)  Gout  HLD (hyperlipidemia)  HTN (hypertension)  No significant past surgical history    FAMILY HISTORY:  Family history of polymyalgia rheumatica: mother  Family history of temporal arteritis: mother  FH: Alzheimers disease: mother  Family history of TIAs: recurrent TIAs in father    SOCIAL HISTORY: doesn't smoke    Vital Signs Last 24 Hrs  T(C): 36.9 (14 Oct 2019 06:00), Max: 36.9 (14 Oct 2019 06:00)  T(F): 98.4 (14 Oct 2019 06:00), Max: 98.4 (14 Oct 2019 06:00)  HR: 108 (14 Oct 2019 05:08) (83 - 127)  BP: 155/102 (14 Oct 2019 05:08) (132/104 - 155/102)  BP(mean): 123 (14 Oct 2019 05:08) (116 - 131)  ABP: --  ABP(mean): --  RR: 18 (14 Oct 2019 05:08) (16 - 20)  SpO2: 96% (14 Oct 2019 05:08) (87% - 97%)    I&O's Detail    13 Oct 2019 07:01  -  14 Oct 2019 07:00  --------------------------------------------------------  IN:  Total IN: 0 mL    OUT:    Voided: 300 mL  Total OUT: 300 mL    Total NET: -300 mL    PHYSICAL EXAM:    Constitutional: NAD, obese male  Eyes: PERRLA  ENMT: MMM  Neck: supple  Back: midline  Respiratory: CTA b/l  Cardiovascular: irregularly irregular  Gastrointestinal: soft, NTND, + BS, obese habitus  Extremities: warm  Vascular: + 2 pulses radial  Neurological: AAO x 4  Skin: no ulcer, no rash  Lymph Nodes: no LAD  Musculoskeletal: no joint swelling  Psychiatric: normal affect    LABS:  CBC Full  -  ( 14 Oct 2019 07:35 )  WBC Count : 7.26 K/uL  RBC Count : 5.30 M/uL  Hemoglobin : 14.7 g/dL  Hematocrit : 45.8 %  Platelet Count - Automated : 303 K/uL  Mean Cell Volume : 86.4 fl  Mean Cell Hemoglobin : 27.7 pg  Mean Cell Hemoglobin Concentration : 32.1 gm/dL  Auto Neutrophil # : x  Auto Lymphocyte # : x  Auto Monocyte # : x  Auto Eosinophil # : x  Auto Basophil # : x  Auto Neutrophil % : x  Auto Lymphocyte % : x  Auto Monocyte % : x  Auto Eosinophil % : x  Auto Basophil % : x    10-13    143  |  107  |  17  ----------------------------<  122<H>  3.9   |  22  |  1.00    Ca    9.3      13 Oct 2019 18:43    TPro  8.2  /  Alb  3.9  /  TBili  0.7  /  DBili  x   /  AST  36  /  ALT  69<H>  /  AlkPhos  89  10-13    CAPILLARY BLOOD GLUCOSE      POCT Blood Glucose.: 111 mg/dL (14 Oct 2019 07:15)    PT/INR - ( 14 Oct 2019 07:35 )   PT: 12.8 sec;   INR: 1.13          PTT - ( 14 Oct 2019 07:35 )  PTT:35.9 sec      EKG:    ECHO, US:    RADIOLOGY:  CT perfusion MAPs:  Abnormal perfusion with RAPID calculated mismatch volume of 32 ml and mismatch ratio is infinite, particularly involving the right parietal lobe and left frontal lobe.    CTA head/neck:  1.  No extracranial or intracranial large vessel occlusion, high-grade stenosis, or evidence of dissection.  2.  Pulmonary hypertension.    CT head w/o contrast:  Small area of hypodensity is noted in the right temporoparietal region which is suspicious for acute/subacute infarction involving the right MCA territory. No acute intracranial hemorrhage.

## 2019-10-14 NOTE — OCCUPATIONAL THERAPY INITIAL EVALUATION ADULT - ADDITIONAL COMMENTS
Pt is currently visiting from [place] with his wife. Pt lives with his spouse in private house; I flight of stairs to bedroom. 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 Denies prior use/possession of AEs/DMEs.

## 2019-10-14 NOTE — DISCHARGE NOTE PROVIDER - NSDCQMSTROKERISK_NEU_ALL_CORE
High cholesterol/History of a stroke or TIA/High blood pressure/Obesity/Atrial fibrillation/Diabetes

## 2019-10-14 NOTE — PHYSICAL THERAPY INITIAL EVALUATION ADULT - DIAGNOSIS, PT EVAL
Asthma
5D: Impaired Motor Function and Sensory Integrity Associated with Nonprogressive Disorders of the Central Nervous System—Acquired in Adolescence or Adulthood

## 2019-10-14 NOTE — OCCUPATIONAL THERAPY INITIAL EVALUATION ADULT - PERTINENT HX OF CURRENT PROBLEM, REHAB EVAL
CT Head - Small area of hypodensity is noted in the right temporoparietal region; CT Perfusion - Abnormal perfusion with RAPID calculated mismatch volume of 32 ml; CT Neck Negative

## 2019-10-14 NOTE — DISCHARGE NOTE PROVIDER - NSDCCPCAREPLAN_GEN_ALL_CORE_FT
PRINCIPAL DISCHARGE DIAGNOSIS  Diagnosis: Acute CVA (cerebrovascular accident)  Assessment and Plan of Treatment:         SECONDARY DISCHARGE DIAGNOSES  Diagnosis: Paroxysmal atrial fibrillation  Assessment and Plan of Treatment: PRINCIPAL DISCHARGE DIAGNOSIS  Diagnosis: Acute CVA (cerebrovascular accident)  Assessment and Plan of Treatment: You were noted to have a stroke, also known as a cerebrovascular accident (CVA). This was found based on your symptom profile, and findings noted on computed tomographical imaging (CT imaging) of your brain.  This led to your symptoms of loss of focus, incoherent speech, and blurred vision. We took an image of your heart which didnt show any severly reduced function or clots. You were found to have an irregular heart rhythm known as atrial fibrillation. You are going to be sent home on a new medication, which is a blood thinner known as elquis. Please take 5 mg twice a day (morning and night). We are also going to send you home on a medication known as coreg, which will help manage your blood pressure and also your heart rate given your atrial fibrillation. Please continue taking your home blood pressure medications as well. We will also be sending you on a medication called atorvastatin 80 mg, which helps keep cholesterol low and will helpt prevent future strokes. It is crucial you follow up with your primary care physcian and a neurologist, once you are back in [place]. Symptom improvement varies greatly, varying from minimal improvement to even possibly returning back to baseline with rehabilitation therapy. Should you start to experience symptoms such as but not limited to: changes in vision, changes in hearing, severe weakness/dizziness, fainting spells, or difficulties moving your eyelids or mouth, please return to the emergency department immediately for interval evaluation.        SECONDARY DISCHARGE DIAGNOSES  Diagnosis: Atrial fibrillation  Assessment and Plan of Treatment: You have a known diagnosis of atrial fibrillation prior to your admission. This condition, if not treated, increases your risk of stroke or heart attack. Please take eliquis 5 mg twice a day and coreg along with your home blood pressure medicaitons. Please make sure to continue taking these medications to avoid developing blood clots and to lower your risk of stroke. Additionally be sure to follow up with your primary care physician (and/or cardiologist) on a regular basis to ensure that this condition does not require changes to the dose or type of medication.      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 This was found based on your symptom profile, and findings noted on computed tomographical imaging (CT imaging) of your brain.  This led to your symptoms of loss of focus, incoherent speech, and blurred vision. We took an image of your heart which didnt show any severly reduced function or clots. You were found to have an irregular heart rhythm known as atrial fibrillation. You are going to be sent home on a new medication, which is a blood thinner known as elquis. Please take 5 mg twice a day (morning and night). We are also going to send you home on a medication known as metoprolol tartrate 37.5 mg (take twice a day), which will help manage your blood pressure and also your heart rate given your atrial fibrillation. You will also be given lisinopril 10 mg (take once a day) to help manage your BP. We will also be sending you on a medication called atorvastatin 80 mg, which helps keep cholesterol low and will helpt prevent future strokes. Please take valtrex 1000 mg (twice a day) to help the lesions under eye due to herpes virus. You have a follow up appointment at Dr. Conner office on 10/21/2019, please go and ask for Deidre. It is crucial you follow up with your primary care physcian and a neurologist, once you are back in [place]. Symptom improvement varies greatly, varying from minimal improvement to even possibly returning back to baseline with rehabilitation therapy. Should you start to experience symptoms such as but not limited to: changes in vision, changes in hearing, severe weakness/dizziness, fainting spells, or difficulties moving your eyelids or mouth, please return to the emergency department immediately for interval evaluation.        SECONDARY DISCHARGE DIAGNOSES  Diagnosis: Atrial fibrillation  Assessment and Plan of Treatment: You have a known diagnosis of atrial fibrillation prior to your admission. This condition, if not treated, increases your risk of stroke or heart attack. Please take eliquis 5 mg twice a day and metoprolol tartrate 37.5  mg twice a day, along with lisinopril 10 mg one a day. Do not take other home BP medications until you see your GP. Please make sure to continue taking these medications to avoid developing blood clots and to lower your risk of stroke. Additionally be sure to follow up with your primary care physician (and/or cardiologist) on a regular basis to ensure that this condition does not require changes to the dose or type of medication.      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

## 2019-10-14 NOTE — PHYSICAL THERAPY INITIAL EVALUATION ADULT - GENERAL OBSERVATIONS, REHAB EVAL
Pt found sitting at edge of bed with wife at bedside, + telemetry, + hep lock in NAD, agreeable to PT hesham, cleared by MAYELA Dixon and Medical team,

## 2019-10-14 NOTE — DISCHARGE NOTE PROVIDER - CARE PROVIDER_API CALL
Leslie Dawkins)  Neurology; Vascular Neurology  [address], [address] [place] [address]  Phone: [phone]  Fax: [phone]  Follow Up Time:

## 2019-10-14 NOTE — CONSULT NOTE ADULT - PROBLEM SELECTOR RECOMMENDATION 2
CHADS-VASC - 4  Currently rate controlled as per VS (last recorded - 96) on Metoprolol tartrate 12.5 BID  -AC as above, would increase BB for improved control if stroke okay with it

## 2019-10-14 NOTE — CONSULT NOTE ADULT - ASSESSMENT
per Neurology    63 year old male PMH HTN, HLD, DM2, gout visiting from  presenting with gait instability, blurred vision and found to have right temporoparietal infarct on CT head and previously unknown afib w/ RVR.      Problem/Plan - 1:  ·  Problem: Acute CVA (cerebrovascular accident).  Plan: IMPRESSION: Small area of hypodensity is noted in the right   temporoparietal region which is suspicious for acute/subacute infarction   involving the right MCA territory. No acute intracranial hemorrhage.  - Closely follow neuro checks every 2-4 hours   - Repeat HCT for acute changes in neuro exam  -JESUS  - Goal SBP < 180  - Bedside Dysphagia Screen  - PT/OT/SLP   - Obtain Hemoglobin A1C, Lipid Profile Panel, and TSH    Secondary Stroke Prevention Medicaiton  - C/w aspirin 81 /plavix 75  - Start atorvastatin 80mg PO daily- cholesterol and stroke prevention   - Start heparin SQ and SCDs for DVT prophylaxis.    Problem/Plan - 2:  ·  Problem: Afib.  Plan: -per attending no AC at this time   -lopressor 5 IV prn for rate goal <110  -assess tomorrow with stroke team need for AC.    Problem/Plan - 3:  ·  Problem: HTN (hypertension).  Plan: permissive BP goal <180  Hold lisinopril and felodipine.    Problem/Plan - 4:  ·  Problem: DM type 2 (diabetes mellitus, type 2).  Plan: MISS  hold metformin.    Problem/Plan - 5:  ·  Problem: HLD (hyperlipidemia).  Plan: atorvastatin 80    #gout  continue w/ allopurinol 200 daily.    Problem/Plan - 6:  Problem: Nutrition, metabolism, and development symptoms. Plan: F: none  E: replete K <4, Mg <2  N: DASH/TLC, carb consistent; NPO after 12 for JESUS  PPI: not needed  DVT: lovenox, SCDs.
63 year old male PMH HTN, HLD, DM2, gout visiting from [place] presenting with gait instability, blurred vision and found to have right temporoparietal infarct on CT head and previously unknown afib w/ RVR.

## 2019-10-14 NOTE — OCCUPATIONAL THERAPY INITIAL EVALUATION ADULT - GENERAL OBSERVATIONS, REHAB EVAL
Pt is right hand dominant. Pt's RN Zack aware of intent to assess/tx; cleared Pt. Pt seated at EOB upon entry. Wife at bedside - +heplock, telemetry attached.

## 2019-10-14 NOTE — CONSULT NOTE ADULT - PROBLEM SELECTOR RECOMMENDATION 9
Imaging as above  -C/w workup and management as per stroke team  -C/w ASA, Plavix, Statin, holding off A/C as per chart - will d/w Stroke  -Pending JESUS

## 2019-10-14 NOTE — DISCHARGE NOTE PROVIDER - HOSPITAL COURSE
63 year old male PMH HTN, HLD, DM2, gout visiting from [place] presenting with gait instability, blurred vision, and incoherent speech on 10/13.        CVA    - Pt is a 63 year old male PMH HTN, HLD, DM2, gout visiting from  presenting with gait instability, blurred vision since this afternoon with 5-10 minutes of unresponsiveness at that time.  He did not respond to his family at that time for 5-10 minutes. After that he noted blurred vision and head heaviness.         CVA    -Pt presented to the ED on 10/13 and stroke code was called. NIHSS 0, no tPA given; s/p aspirin 81, plavix 75.     -CT head w/o contrast: Small area of hypodensity is noted in the right temporoparietal region which is suspicious for acute/subacute infarction involving the right MCA territory. No acute intracranial hemorrhage.    -CT perfusion MAPs: Abnormal perfusion with RAPID calculated mismatch volume of 32 ml and mismatch ratio is infinite, particularly involving the right parietal lobe and left frontal lobe.    -CTA head/neck: No extracranial or intracranial large vessel occlusion, high-grade stenosis, or evidence of dissection. Pulmonary hypertension.    -JESUS showed    -repeat CT showed         New onset A.Fib with RVR    -patient found to be in Afib with HR in 120's in ED    -given 5 mg lopressor IV in ED    -started on lopressor 12.5 mg BID    -patient started on eliquis 5 mg BID        New Medications: Eliquis 5 mg BID, Pt is a 63 year old male PMH HTN, HLD, DM2, gout visiting from  presenting with gait instability, blurred vision since this afternoon with 5-10 minutes of unresponsiveness at that time.  He did not respond to his family at that time for 5-10 minutes. After that he noted blurred vision and head heaviness.         CVA    -Pt presented to the ED on 10/13 and stroke code was called. NIHSS 0, no tPA given; s/p aspirin 81, plavix 75.     -CT head w/o contrast: Small area of hypodensity is noted in the right temporoparietal region which is suspicious for acute/subacute infarction involving the right MCA territory. No acute intracranial hemorrhage.    -CT perfusion MAPs: Abnormal perfusion with RAPID calculated mismatch volume of 32 ml and mismatch ratio is infinite, particularly involving the right parietal lobe and left frontal lobe.    -CTA head/neck: No extracranial or intracranial large vessel occlusion, high-grade stenosis, or evidence of dissection. Pulmonary hypertension.    -JESUS showed mildly decreased left ventricular systolic function, Dilated L. atrium, with no thrombus    -repeat CT showed         New onset A.Fib with RVR    -patient found to be in Afib with HR in 120's in ED    -given 5 mg lopressor IV in ED, lopressor 12.5 mg BID during stay    -will    -patient started on eliquis 5 mg BID        HTN    -patient was noted to be HTN throughout stay (150's systolic/ 100's diastolic)    -patient takes lisinopril and CCB at home. will tell patient to resume and coreg will be started to help both rate control and BP's            New Medications: Eliquis 5 mg BID, Pt is a 63 year old male PMH HTN, HLD, DM2, gout visiting from  presenting with gait instability, blurred vision since this afternoon with 5-10 minutes of unresponsiveness at that time.  He did not respond to his family at that time for 5-10 minutes. After that he noted blurred vision and head heaviness.         CVA    -Pt presented to the ED on 10/13 and stroke code was called. NIHSS 0, no tPA given; s/p aspirin 81, plavix 75.     -CT head w/o contrast: Small area of hypodensity is noted in the right temporoparietal region which is suspicious for acute/subacute infarction involving the right MCA territory. No acute intracranial hemorrhage.    -CT perfusion MAPs: Abnormal perfusion with RAPID calculated mismatch volume of 32 ml and mismatch ratio is infinite, particularly involving the right parietal lobe and left frontal lobe.    -CTA head/neck: No extracranial or intracranial large vessel occlusion, high-grade stenosis, or evidence of dissection. Pulmonary hypertension.    -JESUS showed mildly decreased left ventricular systolic function, Dilated L. atrium, with no thrombus    -repeat CT showed no worsening of infarct, patient is asymptomatic             New onset A.Fib with RVR    -patient found to be in Afib with HR in 120-130's     -patient started on metoprolol tartrate 37.5 mg and lisinopril 10 mg for BP control    -HR maintained in 80-90 range    -patient started on eliquis 5 mg BID    -imperative patient follows up with PCP        HTN    -patient was noted to be HTN throughout stay (150's systolic/ 100's diastolic)    -patient will be sent home on lisinopril 10 mg. Not to restart other home meds until he meets with his PCP            New Medications: Eliquis 5 mg BID, metoprolol tartrate 37.5 mg  BID, lisinopril 10 mg Pt is a 63 year old male PMH HTN, HLD, DM2, gout visiting from  presenting with gait instability, blurred vision since this afternoon with 5-10 minutes of unresponsiveness at that time.  He did not respond to his family at that time for 5-10 minutes. After that he noted blurred vision and head heaviness.         CVA    -Pt presented to the ED on 10/13 and stroke code was called. NIHSS 0, no tPA given; s/p aspirin 81, plavix 75.     -CT head w/o contrast: Small area of hypodensity is noted in the right temporoparietal region which is suspicious for acute/subacute infarction involving the right MCA territory. No acute intracranial hemorrhage.    -CT perfusion MAPs: Abnormal perfusion with RAPID calculated mismatch volume of 32 ml and mismatch ratio is infinite, particularly involving the right parietal lobe and left frontal lobe.    -CTA head/neck: No extracranial or intracranial large vessel occlusion, high-grade stenosis, or evidence of dissection. Pulmonary hypertension.    -JESUS showed mildly decreased left ventricular systolic function, Dilated L. atrium, with no thrombus    -repeat CT showed no worsening of infarct, patient is asymptomatic     -will be sent home on atorvastatin 80 mg            New onset A.Fib with RVR    -patient found to be in Afib with HR in 120-130's     -patient started on metoprolol tartrate 37.5 mg and lisinopril 10 mg for BP control    -HR maintained in 80-90 range    -patient started on eliquis 5 mg BID    -imperative patient follows up with PCP        HTN    -patient was noted to be HTN throughout stay (150's systolic/ 100's diastolic)    -patient will be sent home on lisinopril 10 mg. 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 Will be sent home with prescription for 4 more days         New Medications: Eliquis 5 mg BID, metoprolol tartrate 37.5 mg  BID, lisinopril 10 mg, atorvastatin 80 mg, valtrex 1000 mg BID (for 4 days)

## 2019-10-15 LAB
GLUCOSE BLDC GLUCOMTR-MCNC: 106 MG/DL — HIGH (ref 70–99)
GLUCOSE BLDC GLUCOMTR-MCNC: 88 MG/DL — SIGNIFICANT CHANGE UP (ref 70–99)
GLUCOSE BLDC GLUCOMTR-MCNC: 93 MG/DL — SIGNIFICANT CHANGE UP (ref 70–99)
GLUCOSE BLDC GLUCOMTR-MCNC: 98 MG/DL — SIGNIFICANT CHANGE UP (ref 70–99)

## 2019-10-15 PROCEDURE — 99233 SBSQ HOSP IP/OBS HIGH 50: CPT

## 2019-10-15 PROCEDURE — 99233 SBSQ HOSP IP/OBS HIGH 50: CPT | Mod: GC

## 2019-10-15 RX ORDER — CARVEDILOL PHOSPHATE 80 MG/1
6.25 CAPSULE, EXTENDED RELEASE ORAL EVERY 12 HOURS
Refills: 0 | Status: DISCONTINUED | OUTPATIENT
Start: 2019-10-15 | End: 2019-10-16

## 2019-10-15 RX ORDER — VALACYCLOVIR 500 MG/1
1000 TABLET, FILM COATED ORAL ONCE
Refills: 0 | Status: COMPLETED | OUTPATIENT
Start: 2019-10-15 | End: 2019-10-15

## 2019-10-15 RX ORDER — VALACYCLOVIR 500 MG/1
1000 TABLET, FILM COATED ORAL EVERY 12 HOURS
Refills: 0 | Status: DISCONTINUED | OUTPATIENT
Start: 2019-10-16 | End: 2019-10-17

## 2019-10-15 RX ADMIN — APIXABAN 5 MILLIGRAM(S): 2.5 TABLET, FILM COATED ORAL at 00:07

## 2019-10-15 RX ADMIN — APIXABAN 5 MILLIGRAM(S): 2.5 TABLET, FILM COATED ORAL at 23:26

## 2019-10-15 RX ADMIN — VALACYCLOVIR 1000 MILLIGRAM(S): 500 TABLET, FILM COATED ORAL at 16:29

## 2019-10-15 RX ADMIN — VALACYCLOVIR 1000 MILLIGRAM(S): 500 TABLET, FILM COATED ORAL at 21:57

## 2019-10-15 RX ADMIN — APIXABAN 5 MILLIGRAM(S): 2.5 TABLET, FILM COATED ORAL at 11:11

## 2019-10-15 RX ADMIN — CARVEDILOL PHOSPHATE 6.25 MILLIGRAM(S): 80 CAPSULE, EXTENDED RELEASE ORAL at 17:40

## 2019-10-15 RX ADMIN — ATORVASTATIN CALCIUM 80 MILLIGRAM(S): 80 TABLET, FILM COATED ORAL at 21:57

## 2019-10-15 RX ADMIN — Medication 200 MILLIGRAM(S): at 11:10

## 2019-10-15 RX ADMIN — Medication 25 MILLIGRAM(S): at 05:52

## 2019-10-16 LAB
GLUCOSE BLDC GLUCOMTR-MCNC: 106 MG/DL — HIGH (ref 70–99)
GLUCOSE BLDC GLUCOMTR-MCNC: 159 MG/DL — HIGH (ref 70–99)
GLUCOSE BLDC GLUCOMTR-MCNC: 184 MG/DL — HIGH (ref 70–99)
GLUCOSE BLDC GLUCOMTR-MCNC: 88 MG/DL — SIGNIFICANT CHANGE UP (ref 70–99)

## 2019-10-16 PROCEDURE — 99233 SBSQ HOSP IP/OBS HIGH 50: CPT

## 2019-10-16 PROCEDURE — 99233 SBSQ HOSP IP/OBS HIGH 50: CPT | Mod: GC

## 2019-10-16 RX ORDER — CARVEDILOL PHOSPHATE 80 MG/1
12.5 CAPSULE, EXTENDED RELEASE ORAL EVERY 12 HOURS
Refills: 0 | Status: DISCONTINUED | OUTPATIENT
Start: 2019-10-16 | End: 2019-10-16

## 2019-10-16 RX ORDER — METOPROLOL TARTRATE 50 MG
12.5 TABLET ORAL EVERY 12 HOURS
Refills: 0 | Status: DISCONTINUED | OUTPATIENT
Start: 2019-10-16 | End: 2019-10-17

## 2019-10-16 RX ORDER — LISINOPRIL 2.5 MG/1
10 TABLET ORAL DAILY
Refills: 0 | Status: DISCONTINUED | OUTPATIENT
Start: 2019-10-16 | End: 2019-10-17

## 2019-10-16 RX ORDER — LISINOPRIL 2.5 MG/1
20 TABLET ORAL DAILY
Refills: 0 | Status: DISCONTINUED | OUTPATIENT
Start: 2019-10-16 | End: 2019-10-16

## 2019-10-16 RX ADMIN — ATORVASTATIN CALCIUM 80 MILLIGRAM(S): 80 TABLET, FILM COATED ORAL at 21:53

## 2019-10-16 RX ADMIN — Medication 12.5 MILLIGRAM(S): at 18:14

## 2019-10-16 RX ADMIN — CARVEDILOL PHOSPHATE 12.5 MILLIGRAM(S): 80 CAPSULE, EXTENDED RELEASE ORAL at 07:11

## 2019-10-16 RX ADMIN — VALACYCLOVIR 1000 MILLIGRAM(S): 500 TABLET, FILM COATED ORAL at 18:14

## 2019-10-16 RX ADMIN — Medication 200 MILLIGRAM(S): at 12:16

## 2019-10-16 RX ADMIN — APIXABAN 5 MILLIGRAM(S): 2.5 TABLET, FILM COATED ORAL at 12:16

## 2019-10-16 RX ADMIN — Medication 1: at 07:11

## 2019-10-16 RX ADMIN — VALACYCLOVIR 1000 MILLIGRAM(S): 500 TABLET, FILM COATED ORAL at 07:12

## 2019-10-16 RX ADMIN — APIXABAN 5 MILLIGRAM(S): 2.5 TABLET, FILM COATED ORAL at 23:44

## 2019-10-16 RX ADMIN — Medication 1: at 12:15

## 2019-10-16 RX ADMIN — LISINOPRIL 10 MILLIGRAM(S): 2.5 TABLET ORAL at 18:14

## 2019-10-16 NOTE — PROGRESS NOTE ADULT - PROBLEM SELECTOR PLAN 1
IMPRESSION: Small area of hypodensity is noted in the right   temporoparietal region which is suspicious for acute/subacute infarction   involving the right MCA territory. No acute intracranial hemorrhage.  - Repeat CT showed no change in prior imaging  -JESUS-->no thrombus slightly decrease LV fx  - Goal SBP < 180  - PT/OT have cleared for home        Secondary Stroke Prevention Medicaiton  - C/w aspirin 81 /plavix 75  - Start atorvastatin 80mg PO daily- cholesterol and stroke prevention   - Start heparin SQ and SCDs for DVT prophylaxis
IMPRESSION: Small area of hypodensity is noted in the right   temporoparietal region which is suspicious for acute/subacute infarction   involving the right MCA territory. No acute intracranial hemorrhage.  - Repeat HCT this afternoon  -JESUS-->no thrombus slightly decrease LV fx  - Goal SBP < 180  - PT/OT/SLP       Secondary Stroke Prevention Medicaiton  - C/w aspirin 81 /plavix 75  - Start atorvastatin 80mg PO daily- cholesterol and stroke prevention   - Start heparin SQ and SCDs for DVT prophylaxis
Imaging as above  -C/w workup and management as per stroke team  -C/w Statin, Eliquis
Imaging as above  -C/w workup and management as per stroke team  -C/w Statin, Eliquis
want BP to be 140-150's systolic/ 90's systolic  -was on coreg 12.5 mg O/N and this morning and was noted to still have HR >100 with blood pressure in 140's  -patient will be started on metoprolol tartrate 12.5 mg for HR management  -home med lisinopril 20 mg will be restarted for BP management  -will monitor pressures today  -monitor pressures and rate overnight. if stable can be d/c tomorrow

## 2019-10-16 NOTE — PROGRESS NOTE ADULT - PROBLEM SELECTOR PLAN 2
-elquis 5 mg BID  -CHADVasc 4  -eliquis 5 mg BID  -lopressor 12.5 mg BID yesterday  -got am lopressor dose, will discontinue night dose  -will be started on coreg 6.25 mg tonight
-elquis 5 mg BID  -lopressor 25 mg BID
CHADS-VASC - 4  Currently rate controlled as per VS on Coreg  BID  -A/C as above
CHADS-VASC - 4  Currently rate controlled as per VS on Coreg 6.25 BID  -A/C as above
-elquis 5 mg BID  -CHADVasc 4  -plan as above

## 2019-10-16 NOTE — PROGRESS NOTE ADULT - PROBLEM SELECTOR PROBLEM 5
HLD (hyperlipidemia)
HLD (hyperlipidemia)
Idiopathic chronic gout without tophus, unspecified site
Idiopathic chronic gout without tophus, unspecified site
HLD (hyperlipidemia)

## 2019-10-16 NOTE — PROGRESS NOTE ADULT - PROBLEM SELECTOR PROBLEM 3
Essential hypertension
Essential hypertension
HTN (hypertension)
HTN (hypertension)
Acute CVA (cerebrovascular accident)

## 2019-10-16 NOTE — PROGRESS NOTE ADULT - SUBJECTIVE AND OBJECTIVE BOX
HPI:  Brief Hospital Course:  Pt is a 63 year old male PMH HTN, HLD, DM2, gout visiting from  presenting with gait instability, blurred vision since this afternoon with 5-10 minutes of unresponsiveness at that time. Patient reports that since arriving on Tuesday he has felt dyspneic on exertion but states he does not exercise at home and has been doing a lot of walking here. Today he felt like his knee gave out and his family had to help him to his bench. He did not respond to his family at that time for 5-10 minutes. After that he noted blurred vision and head heaviness. Pt presented to the ED on 10/13 and stroke code was called. NIHSS 0, no tPA given; s/p aspirin 81, plavix 75.  Pt was started on BB on 10/14 for improved HR control    Subjective:  Pt currently feels well. Reports feeling head heaviness but otherwise feels well. 12 pt ROS is otherwise negative.    Allergies    penicillin (Unknown)    Intolerances    Home meds: Lisinopril    MEDICATIONS  (STANDING):  allopurinol 200 milliGRAM(s) Oral daily  apixaban 5 milliGRAM(s) Oral every 12 hours  atorvastatin 80 milliGRAM(s) Oral at bedtime  carvedilol 12.5 milliGRAM(s) Oral every 12 hours  dextrose 5%. 1000 milliLiter(s) (50 mL/Hr) IV Continuous <Continuous>  dextrose 50% Injectable 12.5 Gram(s) IV Push once  dextrose 50% Injectable 25 Gram(s) IV Push once  dextrose 50% Injectable 25 Gram(s) IV Push once  influenza   Vaccine 0.5 milliLiter(s) IntraMuscular once  insulin lispro (HumaLOG) corrective regimen sliding scale   SubCutaneous four times a day before meals  valACYclovir 1000 milliGRAM(s) Oral every 12 hours    MEDICATIONS  (PRN):  dextrose 40% Gel 15 Gram(s) Oral once PRN Blood Glucose LESS THAN 70 milliGRAM(s)/deciliter  glucagon  Injectable 1 milliGRAM(s) IntraMuscular once PRN Glucose LESS THAN 70 milligrams/deciliter    Drug Dosing Weight  Height (cm): 175.26 (13 Oct 2019 18:19)  Weight (kg): 114.3 (13 Oct 2019 18:19)  BMI (kg/m2): 37.2 (13 Oct 2019 18:19)  BSA (m2): 2.28 (13 Oct 2019 18:19)    PAST MEDICAL & SURGICAL HISTORY:  DM type 2 (diabetes mellitus, type 2)  Gout  HLD (hyperlipidemia)  HTN (hypertension)  No significant past surgical history    FAMILY HISTORY:  Family history of polymyalgia rheumatica: mother  Family history of temporal arteritis: mother  FH: Alzheimers disease: mother  Family history of TIAs: recurrent TIAs in father    SOCIAL HISTORY: doesn't smoke    Vital Signs Last 24 Hrs  T(C): 36.5 (16 Oct 2019 06:25), Max: 36.6 (16 Oct 2019 02:04)  T(F): 97.7 (16 Oct 2019 06:25), Max: 97.8 (16 Oct 2019 02:04)  HR: 112 (16 Oct 2019 08:20) (96 - 120)  BP: 107/78 (16 Oct 2019 08:20) (107/78 - 156/100)  BP(mean): 88 (16 Oct 2019 08:20) (88 - 123)  RR: 18 (16 Oct 2019 08:20) (16 - 18)  SpO2: 96% (16 Oct 2019 08:20) (94% - 96%)    PHYSICAL EXAM:    Constitutional: NAD, obese male  Eyes: PERRLA  ENMT: MMM  Neck: supple  Back: midline  Respiratory: CTA b/l  Cardiovascular: irregularly irregular  Gastrointestinal: soft, NTND, + BS, obese habitus  Extremities: warm  Vascular: + 2 pulses radial  Neurological: AAO x 4  Skin: no ulcer, no rash  Lymph Nodes: no LAD  Musculoskeletal: no joint swelling  Psychiatric: normal affect    LABS:    CAPILLARY BLOOD GLUCOSE      POCT Blood Glucose.: 111 mg/dL (14 Oct 2019 07:15)    PT/INR - ( 14 Oct 2019 07:35 )   PT: 12.8 sec;   INR: 1.13          PTT - ( 14 Oct 2019 07:35 )  PTT:35.9 sec      EKG:    ECHO, US:  JESUS:   1. Patient is tachycardic throughout the study. Mildly reduced left ventricular systolic function.   2. The right ventricle is normal in size and systolic function.   3. The left atrium is dilated.   4. No thrombus seen in the left atrium or in the left atrial appendage. There is severe spontaneous echo contrast, "smoke", seen in the left atrial appendage. Spectral Doppler reveals decreased left atrial appendage velocities.   5. There is mild mitral regurgitation.   6. There is mild tricuspid regurgitation.   7. 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 There is mild non-mobile plaque in the visualized portion of the descending aorta. There is mild non-mobile plaque in the visualized portion of the aortic arch.    RADIOLOGY:  CT perfusion MAPs:  Abnormal perfusion with RAPID calculated mismatch volume of 32 ml and mismatch ratio is infinite, particularly involving the right parietal lobe and left frontal lobe.    CTA head/neck:  1.  No extracranial or intracranial large vessel occlusion, high-grade stenosis, or evidence of dissection.  2.  Pulmonary hypertension.    CT head w/o contrast:  Small area of hypodensity is noted in the right temporoparietal region which is suspicious for acute/subacute infarction involving the right MCA territory. No acute intracranial hemorrhage.
HPI:  Brief Hospital Course:  Pt is a 63 year old male PMH HTN, HLD, DM2, gout visiting from  presenting with gait instability, blurred vision since this afternoon with 5-10 minutes of unresponsiveness at that time. Patient reports that since arriving on Tuesday he has felt dyspneic on exertion but states he does not exercise at home and has been doing a lot of walking here. Today he felt like his knee gave out and his family had to help him to his bench. He did not respond to his family at that time for 5-10 minutes. After that he noted blurred vision and head heaviness. Pt presented to the ED on 10/13 and stroke code was called. NIHSS 0, no tPA given; s/p aspirin 81, plavix 75.  Pt was started on BB on 10/14 for improved HR control    Subjective:  Pt currently feels well. Reports feeling head heaviness but otherwise feels well. 12 pt ROS is otherwise negative.    Allergies    penicillin (Unknown)    Intolerances    Home meds: Lisinopril    MEDICATIONS  (STANDING):  allopurinol 200 milliGRAM(s) Oral daily  apixaban 5 milliGRAM(s) Oral every 12 hours  atorvastatin 80 milliGRAM(s) Oral at bedtime  carvedilol 6.25 milliGRAM(s) Oral every 12 hours  dextrose 5%. 1000 milliLiter(s) (50 mL/Hr) IV Continuous <Continuous>  dextrose 50% Injectable 12.5 Gram(s) IV Push once  dextrose 50% Injectable 25 Gram(s) IV Push once  dextrose 50% Injectable 25 Gram(s) IV Push once  influenza   Vaccine 0.5 milliLiter(s) IntraMuscular once  insulin lispro (HumaLOG) corrective regimen sliding scale   SubCutaneous four times a day before meals    MEDICATIONS  (PRN):  dextrose 40% Gel 15 Gram(s) Oral once PRN Blood Glucose LESS THAN 70 milliGRAM(s)/deciliter  glucagon  Injectable 1 milliGRAM(s) IntraMuscular once PRN Glucose LESS THAN 70 milligrams/deciliter      Drug Dosing Weight  Height (cm): 175.26 (13 Oct 2019 18:19)  Weight (kg): 114.3 (13 Oct 2019 18:19)  BMI (kg/m2): 37.2 (13 Oct 2019 18:19)  BSA (m2): 2.28 (13 Oct 2019 18:19)    PAST MEDICAL & SURGICAL HISTORY:  DM type 2 (diabetes mellitus, type 2)  Gout  HLD (hyperlipidemia)  HTN (hypertension)  No significant past surgical history    FAMILY HISTORY:  Family history of polymyalgia rheumatica: mother  Family history of temporal arteritis: mother  FH: Alzheimers disease: mother  Family history of TIAs: recurrent TIAs in father    SOCIAL HISTORY: doesn't smoke    Vital Signs Last 24 Hrs  T(C): 36.7 (15 Oct 2019 05:43), Max: 36.8 (14 Oct 2019 17:59)  T(F): 98 (15 Oct 2019 05:43), Max: 98.3 (14 Oct 2019 17:59)  HR: 97 (15 Oct 2019 08:30) (97 - 130)  BP: 148/110 (15 Oct 2019 08:30) (125/95 - 161/99)  BP(mean): 125 (15 Oct 2019 08:30) (95 - 126)  RR: 17 (15 Oct 2019 08:30) (16 - 18)  SpO2: 96% (15 Oct 2019 08:30) (90% - 98%)    I&O's Detail    13 Oct 2019 07:01  -  14 Oct 2019 07:00  --------------------------------------------------------  IN:  Total IN: 0 mL    OUT:    Voided: 300 mL  Total OUT: 300 mL    Total NET: -300 mL    PHYSICAL EXAM:    Constitutional: NAD, obese male  Eyes: PERRLA  ENMT: MMM  Neck: supple  Back: midline  Respiratory: CTA b/l  Cardiovascular: irregularly irregular  Gastrointestinal: soft, NTND, + BS, obese habitus  Extremities: warm  Vascular: + 2 pulses radial  Neurological: AAO x 4  Skin: no ulcer, no rash  Lymph Nodes: no LAD  Musculoskeletal: no joint swelling  Psychiatric: normal affect    LABS:                        14.7   7.26  )-----------( 303      ( 14 Oct 2019 07:35 )             45.8   10-14    142  |  107  |  15  ----------------------------<  124<H>  3.9   |  22  |  0.90    Ca    9.5      14 Oct 2019 07:35  Mg     2.0     10-14    TPro  8.2  /  Alb  3.9  /  TBili  0.7  /  DBili  x   /  AST  36  /  ALT  69<H>  /  AlkPhos  89  10-13    TPro  8.2  /  Alb  3.9  /  TBili  0.7  /  DBili  x   /  AST  36  /  ALT  69<H>  /  AlkPhos  89  10-13    CAPILLARY BLOOD GLUCOSE      POCT Blood Glucose.: 111 mg/dL (14 Oct 2019 07:15)    PT/INR - ( 14 Oct 2019 07:35 )   PT: 12.8 sec;   INR: 1.13          PTT - ( 14 Oct 2019 07:35 )  PTT:35.9 sec      EKG:    ECHO, US:    RADIOLOGY:  CT perfusion MAPs:  Abnormal perfusion with RAPID calculated mismatch volume of 32 ml and mismatch ratio is infinite, particularly involving the right parietal lobe and left frontal lobe.    CTA head/neck:  1.  No extracranial or intracranial large vessel occlusion, high-grade stenosis, or evidence of dissection.  2.  Pulmonary hypertension.    CT head w/o contrast:  Small area of hypodensity is noted in the right temporoparietal region which is suspicious for acute/subacute infarction involving the right MCA territory. No acute intracranial hemorrhage.
Patient examined at the bedside this morning, in no acute distress. Denies slurred speech, blurred vision, weakness, SOB, CP, HA, numbness tingling, and light headedness.     T(C): 36.6 (10-14-19 @ 13:54), Max: 36.9 (10-14-19 @ 06:00)  HR: 118 (10-14-19 @ 15:30) (83 - 127)  BP: 154/105 (10-14-19 @ 15:30) (132/104 - 163/105)  RR: 18 (10-14-19 @ 15:30) (16 - 20)  SpO2: 97% (10-14-19 @ 15:30) (87% - 98%)  Wt(kg): --Vital Signs Last 24 Hrs  T(C): 36.6 (14 Oct 2019 13:54), Max: 36.9 (14 Oct 2019 06:00)  T(F): 97.9 (14 Oct 2019 13:54), Max: 98.4 (14 Oct 2019 06:00)  HR: 118 (14 Oct 2019 15:30) (83 - 127)  BP: 154/105 (14 Oct 2019 15:30) (132/104 - 163/105)  BP(mean): 123 (14 Oct 2019 15:30) (110 - 131)  RR: 18 (14 Oct 2019 15:30) (16 - 20)  SpO2: 97% (14 Oct 2019 15:30) (87% - 98%)    PHYSICAL EXAM:  GENERAL: NAD, overweight  HEAD:  Atraumatic, Normocephalic  EYES: EOMI, PERRLA, conjunctiva and sclera clear  ENMT:  Moist mucous membranes, Good dentition, No lesions  NECK: Supple, No JVD  NERVOUS SYSTEM:  Alert & Oriented X3, Good concentration; Motor Strength 5/5 B/L upper and lower extremities; DTRs 2+ intact and symmetric  CHEST/LUNG: Clear to percussion bilaterally; No rales, rhonchi, wheezing, or rubs  HEART: Regular rate and rhythm; No murmurs, rubs, or gallops  ABDOMEN: Soft, Nontender, Nondistended; Bowel sounds present  EXTREMITIES:  2+ Peripheral Pulses, No clubbing, cyanosis, or edema  LYMPH: No lymphadenopathy noted  SKIN: No rashes or lesions    Neurologic:     -Mental Status: AAOx3. Speech is fluent with intact naming, repetition, and comprehension, no dysarthria. Recent and remote memory intact. Follows commands. Attention/concentration intact. Fund of knowledge appropriate.     -Cranial Nerves:          II: Visual fields are full to confrontation.          III, IV, VI: EOMI without nystagmus. PERRL b/l          V:  Facial sensation V1-V3 equal and intact           VII: Face is symmetric with normal eye closure and smile          VIII: Hearing is bilaterally intact to finger rub          IX, X: Uvula is midline and soft palate rises symmetrically          XI: Head turning and shoulder shrug are intact.          XII: Tongue protrudes midline     -Motor: Normal bulk and tone. Strength bilateral upper extremity 5/5, bilateral lower extremities 5/5.                     No pronator drift. Rapid alternating movements intact and symmetric     -Sensation: Intact to light touch bilaterally. No neglect or extinction on double simultaneous testing.     -Coordination: No dysmetria on finger-to-nose and heel-to-shin bilaterally     -Reflexes: Downgoing toes bilaterally      -Gait: Narrow gait and steady        LABS:                        14.7   7.26  )-----------( 303      ( 14 Oct 2019 07:35 )             45.8     10-14    142  |  107  |  15  ----------------------------<  124<H>  3.9   |  22  |  0.90    Ca    9.5      14 Oct 2019 07:35  Mg     2.0     10-14    TPro  8.2  /  Alb  3.9  /  TBili  0.7  /  DBili  x   /  AST  36  /  ALT  69<H>  /  AlkPhos  89  10-13    PT/INR - ( 14 Oct 2019 07:35 )   PT: 12.8 sec;   INR: 1.13          PTT - ( 14 Oct 2019 07:35 )  PTT:35.9 sec    CAPILLARY BLOOD GLUCOSE      POCT Blood Glucose.: 120 mg/dL (14 Oct 2019 09:45)  POCT Blood Glucose.: 111 mg/dL (14 Oct 2019 07:15)  POCT Blood Glucose.: 113 mg/dL (13 Oct 2019 18:26)
Patient examined at the bedside. Patient denies any numbness, tingling, weakness, confusion, slurred speach, HA, nausea, vomiting, CP.    T(C): 36.8 (10-15-19 @ 09:09), Max: 36.8 (10-14-19 @ 17:59)  HR: 97 (10-15-19 @ 08:30) (97 - 130)  BP: 148/110 (10-15-19 @ 08:30) (125/95 - 161/99)  RR: 17 (10-15-19 @ 08:30) (16 - 18)  SpO2: 96% (10-15-19 @ 08:30) (90% - 98%)  Wt(kg): --Vital Signs Last 24 Hrs  T(C): 36.8 (15 Oct 2019 09:09), Max: 36.8 (14 Oct 2019 17:59)  T(F): 98.2 (15 Oct 2019 09:09), Max: 98.3 (14 Oct 2019 17:59)  HR: 97 (15 Oct 2019 08:30) (97 - 130)  BP: 148/110 (15 Oct 2019 08:30) (125/95 - 161/99)  BP(mean): 125 (15 Oct 2019 08:30) (95 - 126)  RR: 17 (15 Oct 2019 08:30) (16 - 18)  SpO2: 96% (15 Oct 2019 08:30) (90% - 98%)    Physical Exam:    -Mental Status: AAOx3. Speech is fluent with intact naming, repetition, and comprehension, no dysarthria. Recent and remote memory intact. Follows commands. Attention/concentration intact. Fund of knowledge appropriate.     -Cranial Nerves:          II: Visual fields are full to confrontation.          III, IV, VI: EOMI without nystagmus. PERRL b/l          V:  Facial sensation V1-V3 equal and intact           VII: Face is symmetric with normal eye closure and smile          VIII: Hearing is bilaterally intact to finger rub          IX, X: Uvula is midline and soft palate rises symmetrically          XI: Head turning and shoulder shrug are intact.          XII: Tongue protrudes midline     -Motor: Normal bulk and tone. Strength bilateral upper extremity 5/5, bilateral lower extremities 5/5.                     No pronator drift. Rapid alternating movements intact and symmetric     -Sensation: Intact to light touch bilaterally. No neglect or extinction on double simultaneous testing.     -Coordination: No dysmetria on finger-to-nose and heel-to-shin bilaterally     -Reflexes: Downgoing toes bilaterally      -Gait: Narrow gait and steady  Heart: irregularly irregular, no murmurs  Lungs: CTAB, no rales, rhonchi wheezes.       LABS:                        14.7   7.26  )-----------( 303      ( 14 Oct 2019 07:35 )             45.8     10-14    142  |  107  |  15  ----------------------------<  124<H>  3.9   |  22  |  0.90    Ca    9.5      14 Oct 2019 07:35  Mg     2.0     10-14    TPro  8.2  /  Alb  3.9  /  TBili  0.7  /  DBili  x   /  AST  36  /  ALT  69<H>  /  AlkPhos  89  10-13    PT/INR - ( 14 Oct 2019 07:35 )   PT: 12.8 sec;   INR: 1.13          PTT - ( 14 Oct 2019 07:35 )  PTT:35.9 sec    CAPILLARY BLOOD GLUCOSE      POCT Blood Glucose.: 106 mg/dL (15 Oct 2019 06:52)  POCT Blood Glucose.: 102 mg/dL (14 Oct 2019 22:12)  POCT Blood Glucose.: 107 mg/dL (14 Oct 2019 21:20)  POCT Blood Glucose.: 100 mg/dL (14 Oct 2019 17:28)
Physical Medicine and Rehabilitation Progress Note:    Patient is a 63y old  Male who presents with a chief complaint of right temoroparietal stroke (15 Oct 2019 10:32)      HPI:  63 year old male PMH HTN, HLD, DM2, gout visiting from  presenting with gait instability, blurred vision since this afternoon with 5-10 minutes of unresponsiveness at that time. Patient reports that since arriving on Tuesday he has felt dyspneic on exertion but states he does not exercise at home and has been doing a lot of walking here. Today he felt like his knee gave out and his family had to help him to his bench. He did not respond to his family at that time for 5-10 minutes. After that he noted blurred vision and head heaviness. No facial asymmetry noted, no dysarthria or word-finding difficulty, no palpitations, no chest pain or discomfort, no nausea/vomiting.       ED vitals: T 98.0, /111, p 83, RR 19, 96% on RA  ED labs: Glucose 122, ALT 69  EKG: Afib, rate 129  Stroke code called; NIHSS 0, no tPA given   S/p aspirin 81, plavix 75  CT head:  < from: CT Brain Stroke Protocol (10.13.19 @ 19:14) >  IMPRESSION: Small area of hypodensity is noted in the right   temporoparietal region which is suspicious for acute/subacute infarction   involving the right MCA territory.  No acute intracranial hemorrhage.    CT perfusion: < from: CT Perfusion w/ Maps w/ IV Cont (10.13.19 @ 22:06) >  IMPRESSION: Abnormal perfusion with RAPID calculated mismatch volume of   32 ml and mismatch ratio is infinite, particularly involving the right   parietal lobe and left frontal lobe.    CTA: < from: CT Angio Neck w/ IV Cont (10.13.19 @ 19:47) >  IMPRESSION: No intracranial large vessel occlusion or high-grade stenosis   identified.    < end of copied text >  < from: CT Angio Neck w/ IV Cont (10.13.19 @ 19:47) >  1.  No extracranial large vessel occlusion, high-grade stenosis, or   evidence of dissection.  2.  Pulmonary hypertension. (13 Oct 2019 22:12)                            14.7   7.26  )-----------( 303      ( 14 Oct 2019 07:35 )             45.8       10-14    142  |  107  |  15  ----------------------------<  124<H>  3.9   |  22  |  0.90    Ca    9.5      14 Oct 2019 07:35  Mg     2.0     10-14    TPro  8.2  /  Alb  3.9  /  TBili  0.7  /  DBili  x   /  AST  36  /  ALT  69<H>  /  AlkPhos  89  10-13    Vital Signs Last 24 Hrs  T(C): 36.5 (15 Oct 2019 13:36), Max: 36.8 (14 Oct 2019 17:59)  T(F): 97.7 (15 Oct 2019 13:36), Max: 98.3 (14 Oct 2019 17:59)  HR: 120 (15 Oct 2019 12:15) (97 - 130)  BP: 136/97 (15 Oct 2019 12:15) (125/95 - 148/110)  BP(mean): 111 (15 Oct 2019 12:15) (95 - 125)  RR: 17 (15 Oct 2019 12:15) (17 - 18)  SpO2: 94% (15 Oct 2019 12:15) (90% - 96%)    MEDICATIONS  (STANDING):  allopurinol 200 milliGRAM(s) Oral daily  apixaban 5 milliGRAM(s) Oral every 12 hours  atorvastatin 80 milliGRAM(s) Oral at bedtime  carvedilol 6.25 milliGRAM(s) Oral every 12 hours  dextrose 5%. 1000 milliLiter(s) (50 mL/Hr) IV Continuous <Continuous>  dextrose 50% Injectable 12.5 Gram(s) IV Push once  dextrose 50% Injectable 25 Gram(s) IV Push once  dextrose 50% Injectable 25 Gram(s) IV Push once  influenza   Vaccine 0.5 milliLiter(s) IntraMuscular once  insulin lispro (HumaLOG) corrective regimen sliding scale   SubCutaneous four times a day before meals  valACYclovir 1000 milliGRAM(s) Oral once  valACYclovir 1000 milliGRAM(s) Oral once    MEDICATIONS  (PRN):  dextrose 40% Gel 15 Gram(s) Oral once PRN Blood Glucose LESS THAN 70 milliGRAM(s)/deciliter  glucagon  Injectable 1 milliGRAM(s) IntraMuscular once PRN Glucose LESS THAN 70 milligrams/deciliter    Currently Undergoing Physical Therapy at bedside.    Functional Status Assessment:    Previous Level of Function:     · Ambulation Skills	independent	  · Transfer Skills	independent	  · ADL Skills	independent	  · Work/Leisure Activity	independent	  · Additional Comments	Pt is visiting from the [place]. Pt reports living in a PH with 0 MING and full flight with HR's to bed/bathroom on the 2nd floor. Pt reports being completely independent with functional mobility and ADL's prior to admission without use of an assistive device.	    Cognitive Status Examination:   · Orientation	oriented to person, place, time and situation	  · Level of Consciousness	alert	  · Follows Commands and Answers Questions	100% of the time; able to follow multistep instructions	  · Personal Safety and Judgment	intact	  · Short Term Memory	intact  3/3 recall 	    Range of Motion Exam:   · Active Range of Motion Examination	no Active ROM deficits were identified	    Manual Muscle Testing:   · Manual Muscle Testing Results	no strength deficits were identified 	    Bed Mobility: Scooting/Bridging:     · Level of [place]	independent	    Bed Mobility: Supine to Sit:     · Level of [place]	independent	    Transfer: Sit to Stand:     · Level of [place]	independent	    Transfer: Stand to Sit:     · Level of [place]	independent	  · Weight-Bearing Restrictions	weight-bearing as tolerated	    Gait Skills:     · Level of [place]	independent	  · Weight-Bearing Restrictions	weight-bearing as tolerated	  · Gait Distance	20 ft, limited secondary to rapid HR	    Gait Analysis:     · Gait Pattern Used	2-point gait 	    Stair Negotiation:     · Level of [place]	TBA	    Balance Skills Assessment:     · Sitting Balance: Static	normal balance 	  · Sitting Balance: Dynamic	normal balance 	  · Sit-to-Stand Balance	normal balance 	  · Standing Balance: Static	normal balance 	  · Standing Balance: Dynamic	normal balance 	    Sensory Examination:   Sensory Examination:    Light Touch Sensation:   · Left UE	within normal limits 	  · Right UE	within normal limits 	  · Left LE	within normal limits 	  · Right LE	within normal limits 	    · Coordination Assessed	heel to shin; WNL	      Proprioception:   · Left UE	within normal limits 	  · Right UE	within normal limits 	  · Coordination Assessed	heel to shin; WNL	      Fine Motor Coordination:   Fine Motor Coordination Examination:    Fine Motor Coordination:   · Left Hand, Finger to Nose	normal performance 	  · Right Hand, Finger to Nose	normal performance 	  · Left Hand Thumb/Finger Opposition Skills	normal performance 	  · Right Hand Thumb/Finger Opposition Skills	normal performance 	    Treatment Location:   · Comments	CN II-XII grossly intact, Visual fields grossly WNL in all quadrants, smooth eye traction in all directions (-) nystagmus noted	    Clinical Impressions:   · Criteria for Skilled Therapeutic Interventions	impairments found; rehab potential; therapy frequency	  · Impairments Found (describe specific impairments)	gait, locomotion, and balance; stairs	  · Functional Limitations in Following Categories (describe specific limitations)	home management	  · Risk Reduction/Prevention (Describe Specific Areas of risk reduction/prevention)	risk factors	  · Risk Areas	fall	  · Rehab Potential	good, to achieve stated therapy goals	  · Therapy Frequency	2-3x/week	        PM&R Impression: as above    Current Disposition Plan Recommendations: d/c home with no post discharge rehab needs
Physical Medicine and Rehabilitation Progress Note:    Patient is a 63y old  Male who presents with a chief complaint of right temoroparietal stroke (16 Oct 2019 09:09)      HPI:  63 year old male PMH HTN, HLD, DM2, gout visiting from  presenting with gait instability, blurred vision since this afternoon with 5-10 minutes of unresponsiveness at that time. Patient reports that since arriving on Tuesday he has felt dyspneic on exertion but states he does not exercise at home and has been doing a lot of walking here. Today he felt like his knee gave out and his family had to help him to his bench. He did not respond to his family at that time for 5-10 minutes. After that he noted blurred vision and head heaviness. No facial asymmetry noted, no dysarthria or word-finding difficulty, no palpitations, no chest pain or discomfort, no nausea/vomiting.       ED vitals: T 98.0, /111, p 83, RR 19, 96% on RA  ED labs: Glucose 122, ALT 69  EKG: Afib, rate 129  Stroke code called; NIHSS 0, no tPA given   S/p aspirin 81, plavix 75  CT head:  < from: CT Brain Stroke Protocol (10.13.19 @ 19:14) >  IMPRESSION: Small area of hypodensity is noted in the right   temporoparietal region which is suspicious for acute/subacute infarction   involving the right MCA territory.  No acute intracranial hemorrhage.    CT perfusion: < from: CT Perfusion w/ Maps w/ IV Cont (10.13.19 @ 22:06) >  IMPRESSION: Abnormal perfusion with RAPID calculated mismatch volume of   32 ml and mismatch ratio is infinite, particularly involving the right   parietal lobe and left frontal lobe.    CTA: < from: CT Angio Neck w/ IV Cont (10.13.19 @ 19:47) >  IMPRESSION: No intracranial large vessel occlusion or high-grade stenosis   identified.    < end of copied text >  < from: CT Angio Neck w/ IV Cont (10.13.19 @ 19:47) >  1.  No extracranial large vessel occlusion, high-grade stenosis, or   evidence of dissection.  2.  Pulmonary hypertension. (13 Oct 2019 22:12)                Vital Signs Last 24 Hrs  T(C): 36.6 (16 Oct 2019 13:40), Max: 36.6 (16 Oct 2019 02:04)  T(F): 97.9 (16 Oct 2019 13:40), Max: 97.9 (16 Oct 2019 13:40)  HR: 100 (16 Oct 2019 12:10) (96 - 112)  BP: 139/81 (16 Oct 2019 12:10) (107/78 - 156/100)  BP(mean): 103 (16 Oct 2019 12:10) (88 - 123)  RR: 17 (16 Oct 2019 12:10) (16 - 18)  SpO2: 98% (16 Oct 2019 12:10) (94% - 98%)    MEDICATIONS  (STANDING):  allopurinol 200 milliGRAM(s) Oral daily  apixaban 5 milliGRAM(s) Oral every 12 hours  atorvastatin 80 milliGRAM(s) Oral at bedtime  dextrose 5%. 1000 milliLiter(s) (50 mL/Hr) IV Continuous <Continuous>  dextrose 50% Injectable 12.5 Gram(s) IV Push once  dextrose 50% Injectable 25 Gram(s) IV Push once  dextrose 50% Injectable 25 Gram(s) IV Push once  influenza   Vaccine 0.5 milliLiter(s) IntraMuscular once  insulin lispro (HumaLOG) corrective regimen sliding scale   SubCutaneous four times a day before meals  lisinopril 10 milliGRAM(s) Oral daily  metoprolol tartrate 12.5 milliGRAM(s) Oral every 12 hours  valACYclovir 1000 milliGRAM(s) Oral every 12 hours    MEDICATIONS  (PRN):  dextrose 40% Gel 15 Gram(s) Oral once PRN Blood Glucose LESS THAN 70 milliGRAM(s)/deciliter  glucagon  Injectable 1 milliGRAM(s) IntraMuscular once PRN Glucose LESS THAN 70 milligrams/deciliter    Currently Undergoing Physical Therapy at bedside.    Functional Status Assessment:    Therapeutic Interventions      Sit-Stand Transfer Training  Sit-to-Stand Transfer Training Symptoms Noted During/After Treatment: none  Transfer Training Sit-to-Stand Transfer: independent;  without UE use  Transfer Training Stand-to-Sit Transfer: independent;  full weight-bearing    Gait Training  Gait Training Symptoms Noted During/After Treatment: none  Gait Training: independent;  225ft;  full weight-bearing  Gait Analysis: 2-point gait   slight increased supination R foot >L;  225ft    Stair Training  Weight-Bearing Restrictions: full weight-bearing  Assistive Device: no rails ascent, uni R rail descent  Number of Stairs: 24     Therapeutic Exercise  Therapeutic Exercise Detail: R hand dominant; (L) hand grip5/5, (R) hand  5/5. CN Testing: B/L Frontalis intact; B/L buccinator intact; smile symmetrical; tongue protrusion at midline; B/L eyes open/close intact; Shoulder elevation: intact bilaterally; Vision H-Test: bilateral tracking and smooth pursuit intact; Convergence/Divergence: intact; Vision Quadrant Test: intact bilaterally, able to read clock ~10ft awayMMT 5/5 throughout UE/LE ,Sensation intact to light touch, Finger nose finger and fine motor tip to tip precision WNL without dysmetria eyes open/closed           PM&R Impression: as above    Current Disposition Plan Recommendations: d/c home
Patient examined at the bedside this morning,  with no acute events overnight. Denies fever, chills, nausea, vomiting, CP, SOB, palpitations, weakness, numbness tingling, slurred speech.    T(C): 36.6 (10-16-19 @ 13:40), Max: 36.6 (10-16-19 @ 02:04)  HR: 100 (10-16-19 @ 12:10) (96 - 112)  BP: 139/81 (10-16-19 @ 12:10) (107/78 - 156/100)  RR: 17 (10-16-19 @ 12:10) (16 - 18)  SpO2: 98% (10-16-19 @ 12:10) (94% - 98%)  Wt(kg): --Vital Signs Last 24 Hrs  T(C): 36.6 (16 Oct 2019 13:40), Max: 36.6 (16 Oct 2019 02:04)  T(F): 97.9 (16 Oct 2019 13:40), Max: 97.9 (16 Oct 2019 13:40)  HR: 100 (16 Oct 2019 12:10) (96 - 112)  BP: 139/81 (16 Oct 2019 12:10) (107/78 - 156/100)  BP(mean): 103 (16 Oct 2019 12:10) (88 - 123)  RR: 17 (16 Oct 2019 12:10) (16 - 18)  SpO2: 98% (16 Oct 2019 12:10) (94% - 98%)    PHYSICAL EXAM:  Heart: irregularly irregular, no murmurs  Lungs; CTAB    -Mental Status: AAOx3. Speech is fluent with intact naming, repetition, and comprehension, no dysarthria. Recent and remote memory intact. Follows commands. Attention/concentration intact. Fund of knowledge appropriate.     -Cranial Nerves:          II: Visual fields are full to confrontation.          III, IV, VI: EOMI without nystagmus. PERRL b/l          V:  Facial sensation V1-V3 equal and intact           VII: Face is symmetric with normal eye closure and smile          VIII: Hearing is bilaterally intact to finger rub          IX, X: Uvula is midline and soft palate rises symmetrically          XI: Head turning and shoulder shrug are intact.          XII: Tongue protrudes midline     -Motor: Normal bulk and tone. Strength bilateral upper extremity 5/5, bilateral lower extremities 5/5.                     No pronator drift. Rapid alternating movements intact and symmetric     -Sensation: Intact to light touch bilaterally. No neglect or extinction on double simultaneous testing.    LABS:              CAPILLARY BLOOD GLUCOSE      POCT Blood Glucose.: 184 mg/dL (16 Oct 2019 11:09)  POCT Blood Glucose.: 159 mg/dL (16 Oct 2019 06:49)  POCT Blood Glucose.: 98 mg/dL (15 Oct 2019 21:35)  POCT Blood Glucose.: 88 mg/dL (15 Oct 2019 16:44)

## 2019-10-16 NOTE — PROGRESS NOTE ADULT - PROBLEM SELECTOR PLAN 9
1) PCP Contacted on Admission: (Y/N) --> Name & Phone #: in [place]  2) Date of Contact with PCP:  3) PCP Contacted at Discharge: (Y/N, N/A)  4) Summary of Handoff Given to PCP:   5) Post-Discharge Appointment Date and Location:

## 2019-10-16 NOTE — PROGRESS NOTE ADULT - PROBLEM SELECTOR PLAN 6
continue w/ allopurinol 200 daily
Lifestyle modifications and counseling discussed.
Lifestyle modifications and counseling discussed.
continue w/ allopurinol 200 daily
continue w/ allopurinol 200 daily

## 2019-10-16 NOTE — PROGRESS NOTE ADULT - PROBLEM SELECTOR PROBLEM 1
Acute CVA (cerebrovascular accident)
R/O HTN (hypertension)

## 2019-10-16 NOTE — PROGRESS NOTE ADULT - PROBLEM SELECTOR PROBLEM 6
Idiopathic chronic gout without tophus, unspecified site
Obesity (BMI 30-39.9)
Obesity (BMI 30-39.9)

## 2019-10-16 NOTE — PROGRESS NOTE ADULT - REASON FOR ADMISSION
right temoroparietal stroke
right temporo parietal stroke
right temoroparietal stroke

## 2019-10-16 NOTE — PROGRESS NOTE ADULT - PROBLEM SELECTOR PLAN 3
Goals and management as per stroke  -Diastolic still elevated  -Pt currently on Coreg, can likely be restarted on home meds (Lisinopril/CCB) once cleared by stroke
Goals and management as per stroke  -Improved  -Pt currently on Coreg, can likely be restarted on low dose ACE and uptitrated as outpatient
permissive BP goal <180  Hold lisinopril and felodipine  -will send home on coreg
want BP to be 140-150's systolic/ 90's systolic  -coreg will be started tonight   -will monitor pressures today and will continue home meds tomorrow  -monitor pressures and rate overnight. if stable can be d/c tomorrow
IMPRESSION: Small area of hypodensity is noted in the right   temporoparietal region which is suspicious for acute/subacute infarction   involving the right MCA territory. No acute intracranial hemorrhage.  - Repeat CT showed no change in prior imaging  -JESUS-->no thrombus slightly decrease LV fx  - Goal SBP < 180  - PT/OT have cleared for home        Secondary Stroke Prevention Medicaiton  - C/w aspirin 81 /plavix 75  - Start atorvastatin 80mg PO daily- cholesterol and stroke prevention   - Start heparin SQ and SCDs for DVT prophylaxis

## 2019-10-16 NOTE — PROGRESS NOTE ADULT - ASSESSMENT
63 year old male PMH HTN, HLD, DM2, gout visiting from [place] presenting with gait instability, blurred vision and found to have right temporoparietal infarct on CT head and previously unknown afib w/ RVR.
per Neurology    63 year old male PMH HTN, HLD, DM2, gout visiting from [place] presenting with gait instability, blurred vision and found to have right temporoparietal infarct on CT head and previously unknown afib w/ RVR.    Problem/Plan - 1:  ·  Problem: Acute CVA (cerebrovascular accident).  Plan: IMPRESSION: Small area of hypodensity is noted in the right   temporoparietal region which is suspicious for acute/subacute infarction   involving the right MCA territory. No acute intracranial hemorrhage.  - Repeat CT showed no change in prior imaging  -JESUS-->no thrombus slightly decrease LV fx  - Goal SBP < 180    Secondary Stroke Prevention Medicaiton  - C/w aspirin 81 /plavix 75  - Start atorvastatin 80mg PO daily- cholesterol and stroke prevention   - Start heparin SQ and SCDs for DVT prophylaxis.     Problem/Plan - 2:  ·  Problem: Afib.  Plan: -elquis 5 mg BID  -CHADVasc 4  -eliquis 5 mg BID  -lopressor 12.5 mg BID yesterday  -got am lopressor dose, will discontinue night dose  -will be started on coreg 6.25 mg tonight.     Problem/Plan - 3:  ·  Problem: HTN (hypertension).  Plan: want BP to be 140-150's systolic/ 90's systolic  -coreg will be started tonight   -will monitor pressures today and will continue home meds tomorrow  -monitor pressures and rate overnight. if stable can be d/c tomorrow.     Problem/Plan - 4:  ·  Problem: DM type 2 (diabetes mellitus, type 2).  Plan: MISS  hold metformin.     Problem/Plan - 5:  ·  Problem: HLD (hyperlipidemia).  Plan: atorvastatin 80.     Problem/Plan - 6:  Problem: Idiopathic chronic gout without tophus, unspecified site. Plan: continue w/ allopurinol 200 daily.    Problem/Plan - 7:  ·  Problem: Obesity (BMI 30-39.9).  Plan: counseled.     Problem/Plan - 8:  ·  Problem: Nutrition, metabolism, and development symptoms.  Plan: F: none  E: replete K <4, Mg <2  N: DASH/TLC, carb consistent; NPO after 12 for JESUS  PPI: not needed  DVT: lovenox, SCDs.
per Neurology    63 year old male PMH HTN, HLD, DM2, gout visiting from [place] presenting with gait instability, blurred vision and found to have right temporoparietal infarct on CT head and previously unknown afib w/ RVR.    Problem/Plan - 1:  ·  Problem: Acute CVA (cerebrovascular accident).  Plan: IMPRESSION: Small area of hypodensity is noted in the right   temporoparietal region which is suspicious for acute/subacute infarction   involving the right MCA territory. No acute intracranial hemorrhage.  - Repeat CT showed no change in prior imaging  -JESUS-->no thrombus slightly decrease LV fx  - Goal SBP < 180    Secondary Stroke Prevention Medicaiton  - C/w aspirin 81 /plavix 75  - Start atorvastatin 80mg PO daily- cholesterol and stroke prevention   - Start heparin SQ and SCDs for DVT prophylaxis.     Problem/Plan - 2:  ·  Problem: Afib.  Plan: -elquis 5 mg BID  -CHADVasc 4  -eliquis 5 mg BID  -lopressor 12.5 mg BID yesterday  -got am lopressor dose, will discontinue night dose  -will be started on coreg 6.25 mg tonight.     Problem/Plan - 3:  ·  Problem: HTN (hypertension).  Plan: want BP to be 140-150's systolic/ 90's systolic  -coreg will be started tonight   -will monitor pressures today and will continue home meds tomorrow  -monitor pressures and rate overnight. if stable can be d/c tomorrow.     Problem/Plan - 4:  ·  Problem: DM type 2 (diabetes mellitus, type 2).  Plan: MISS  hold metformin.     Problem/Plan - 5:  ·  Problem: HLD (hyperlipidemia).  Plan: atorvastatin 80.     Problem/Plan - 6:  Problem: Idiopathic chronic gout without tophus, unspecified site. Plan: continue w/ allopurinol 200 daily.    Problem/Plan - 7:  ·  Problem: Obesity (BMI 30-39.9).  Plan: counseled.     Problem/Plan - 8:  ·  Problem: Nutrition, metabolism, and development symptoms.  Plan: F: none  E: replete K <4, Mg <2  N: DASH/TLC, carb consistent; NPO after 12 for JESUS  PPI: not needed  DVT: lovenox, SCDs.
63 year old male PMH HTN, HLD, DM2, gout visiting from [place] presenting with gait instability, blurred vision and found to have right temporoparietal infarct on CT head and previously unknown afib w/ RVR.

## 2019-10-16 NOTE — PROGRESS NOTE ADULT - PROBLEM SELECTOR PLAN 8
F: none  E: replete K <4, Mg <2  N: DASH/TLC, carb consistent; NPO after 12 for JESUS  PPI: not needed  DVT: lovenox, SCDs

## 2019-10-16 NOTE — PROGRESS NOTE ADULT - PROBLEM SELECTOR PROBLEM 4
DM type 2 (diabetes mellitus, type 2)
DM type 2 (diabetes mellitus, type 2)
Type 2 diabetes mellitus without complication, without long-term current use of insulin
Type 2 diabetes mellitus without complication, without long-term current use of insulin
DM type 2 (diabetes mellitus, type 2)

## 2019-10-17 VITALS — TEMPERATURE: 98 F

## 2019-10-17 LAB
GLUCOSE BLDC GLUCOMTR-MCNC: 115 MG/DL — HIGH (ref 70–99)
GLUCOSE BLDC GLUCOMTR-MCNC: 128 MG/DL — HIGH (ref 70–99)

## 2019-10-17 PROCEDURE — 83036 HEMOGLOBIN GLYCOSYLATED A1C: CPT

## 2019-10-17 PROCEDURE — 85730 THROMBOPLASTIN TIME PARTIAL: CPT

## 2019-10-17 PROCEDURE — 83735 ASSAY OF MAGNESIUM: CPT

## 2019-10-17 PROCEDURE — 80048 BASIC METABOLIC PNL TOTAL CA: CPT

## 2019-10-17 PROCEDURE — 80053 COMPREHEN METABOLIC PANEL: CPT

## 2019-10-17 PROCEDURE — 71045 X-RAY EXAM CHEST 1 VIEW: CPT

## 2019-10-17 PROCEDURE — 70498 CT ANGIOGRAPHY NECK: CPT

## 2019-10-17 PROCEDURE — 70496 CT ANGIOGRAPHY HEAD: CPT

## 2019-10-17 PROCEDURE — 80061 LIPID PANEL: CPT

## 2019-10-17 PROCEDURE — 70450 CT HEAD/BRAIN W/O DYE: CPT

## 2019-10-17 PROCEDURE — 99291 CRITICAL CARE FIRST HOUR: CPT | Mod: 25

## 2019-10-17 PROCEDURE — 97116 GAIT TRAINING THERAPY: CPT

## 2019-10-17 PROCEDURE — 82962 GLUCOSE BLOOD TEST: CPT

## 2019-10-17 PROCEDURE — 85027 COMPLETE CBC AUTOMATED: CPT

## 2019-10-17 PROCEDURE — 93312 ECHO TRANSESOPHAGEAL: CPT

## 2019-10-17 PROCEDURE — 0042T: CPT

## 2019-10-17 PROCEDURE — 80307 DRUG TEST PRSMV CHEM ANLYZR: CPT

## 2019-10-17 PROCEDURE — 85652 RBC SED RATE AUTOMATED: CPT

## 2019-10-17 PROCEDURE — 97161 PT EVAL LOW COMPLEX 20 MIN: CPT

## 2019-10-17 PROCEDURE — 97110 THERAPEUTIC EXERCISES: CPT

## 2019-10-17 PROCEDURE — 99232 SBSQ HOSP IP/OBS MODERATE 35: CPT

## 2019-10-17 PROCEDURE — 84484 ASSAY OF TROPONIN QUANT: CPT

## 2019-10-17 PROCEDURE — 84443 ASSAY THYROID STIM HORMONE: CPT

## 2019-10-17 PROCEDURE — 93005 ELECTROCARDIOGRAM TRACING: CPT

## 2019-10-17 PROCEDURE — 86803 HEPATITIS C AB TEST: CPT

## 2019-10-17 PROCEDURE — 36415 COLL VENOUS BLD VENIPUNCTURE: CPT

## 2019-10-17 PROCEDURE — 85025 COMPLETE CBC W/AUTO DIFF WBC: CPT

## 2019-10-17 PROCEDURE — 85610 PROTHROMBIN TIME: CPT

## 2019-10-17 RX ORDER — LISINOPRIL 2.5 MG/1
1 TABLET ORAL
Qty: 0 | Refills: 0 | DISCHARGE

## 2019-10-17 RX ORDER — FELODIPINE 5 MG/1
1 TABLET, FILM COATED, EXTENDED RELEASE ORAL
Qty: 0 | Refills: 0 | DISCHARGE

## 2019-10-17 RX ORDER — ATORVASTATIN CALCIUM 80 MG/1
1 TABLET, FILM COATED ORAL
Qty: 30 | Refills: 0
Start: 2019-10-17 | End: 2019-11-15

## 2019-10-17 RX ORDER — METOPROLOL TARTRATE 50 MG
12.5 TABLET ORAL ONCE
Refills: 0 | Status: COMPLETED | OUTPATIENT
Start: 2019-10-17 | End: 2019-10-17

## 2019-10-17 RX ORDER — ASPIRIN/CALCIUM CARB/MAGNESIUM 324 MG
1 TABLET ORAL
Qty: 30 | Refills: 0
Start: 2019-10-17 | End: 2019-11-15

## 2019-10-17 RX ORDER — METOPROLOL TARTRATE 50 MG
25 TABLET ORAL EVERY 12 HOURS
Refills: 0 | Status: DISCONTINUED | OUTPATIENT
Start: 2019-10-17 | End: 2019-10-17

## 2019-10-17 RX ORDER — VALACYCLOVIR 500 MG/1
1 TABLET, FILM COATED ORAL
Qty: 8 | Refills: 0
Start: 2019-10-17 | End: 2019-10-20

## 2019-10-17 RX ORDER — METOPROLOL TARTRATE 50 MG
1 TABLET ORAL
Qty: 60 | Refills: 0
Start: 2019-10-17 | End: 2019-11-15

## 2019-10-17 RX ORDER — APIXABAN 2.5 MG/1
1 TABLET, FILM COATED ORAL
Qty: 60 | Refills: 0
Start: 2019-10-17 | End: 2019-11-15

## 2019-10-17 RX ORDER — LISINOPRIL 2.5 MG/1
1 TABLET ORAL
Qty: 30 | Refills: 0
Start: 2019-10-17 | End: 2019-11-15

## 2019-10-17 RX ADMIN — Medication 200 MILLIGRAM(S): at 12:00

## 2019-10-17 RX ADMIN — LISINOPRIL 10 MILLIGRAM(S): 2.5 TABLET ORAL at 05:20

## 2019-10-17 RX ADMIN — VALACYCLOVIR 1000 MILLIGRAM(S): 500 TABLET, FILM COATED ORAL at 05:20

## 2019-10-17 RX ADMIN — Medication 12.5 MILLIGRAM(S): at 06:34

## 2019-10-17 RX ADMIN — APIXABAN 5 MILLIGRAM(S): 2.5 TABLET, FILM COATED ORAL at 12:00

## 2019-10-17 RX ADMIN — Medication 12.5 MILLIGRAM(S): at 05:20

## 2019-10-17 NOTE — DIETITIAN INITIAL EVALUATION ADULT. - ENERGY NEEDS
Height 69"; #; #; 158%IBW  BMI 37.2  Ideal body weight used for calculations as pt >120% of IBW. Needs estimated for maintenance in adults; increased protein for s/p CVA

## 2019-10-17 NOTE — DIETITIAN INITIAL EVALUATION ADULT. - PROBLEM SELECTOR PLAN 1
IMPRESSION: Small area of hypodensity is noted in the right   temporoparietal region which is suspicious for acute/subacute infarction   involving the right MCA territory. No acute intracranial hemorrhage.  - Closely follow neuro checks every 2-4 hours   - Repeat HCT for acute changes in neuro exam  -JESUS  - Goal SBP < 180  - Bedside Dysphagia Screen  - PT/OT/SLP   - Obtain Hemoglobin A1C, Lipid Profile Panel, and TSH    Secondary Stroke Prevention Medicaiton  - C/w aspirin 81 /plavix 75  - Start atorvastatin 80mg PO daily- cholesterol and stroke prevention   - Start heparin SQ and SCDs for DVT prophylaxis

## 2019-10-17 NOTE — DISCHARGE NOTE NURSING/CASE MANAGEMENT/SOCIAL WORK - PATIENT PORTAL LINK FT
You can access the FollowMyHealth Patient Portal offered by [facility] by registering at the following website: [email or web address]. By joining [facility]’s FollowMyHealth portal, you will also be able to view your health information using other applications (apps) compatible with our system.

## 2019-10-17 NOTE — DIETITIAN INITIAL EVALUATION ADULT. - PROBLEM SELECTOR PLAN 2
-per attending no AC at this time   -lopressor 5 IV prn for rate goal <110  -assess tomorrow with stroke team need for AC

## 2019-10-17 NOTE — DIETITIAN INITIAL EVALUATION ADULT. - ADD RECOMMEND
1. Encourage PO intake 2. Reinforce diet ed 3. Monitor lytes and replete prn. 4. Pain and bowel regimens per team

## 2019-10-17 NOTE — DISCHARGE NOTE NURSING/CASE MANAGEMENT/SOCIAL WORK - NSDCPEPTSTRK_GEN_ALL_CORE
Call 911 for stroke/Prescribed medications/Stroke warning signs and symptoms/Signs and symptoms of stroke/Stroke support groups for patients, families, and friends/Need for follow up after discharge/Stroke education booklet/Risk factors for stroke

## 2019-10-17 NOTE — DIETITIAN INITIAL EVALUATION ADULT. - OTHER INFO
64 yo/male w/PMHx HTN, HLD, DM, gout, presented with gait instability, and blurred vision. CTH showing R. tempoparietal infarct. Also w/new onset Afib. JESUS negative for thrombus. Pt seen in room, awake, alert, very pleasant, family at bedside. He endorses good appetite, though was not previously following dietary restrictions. Likes eggs, toast, peanut butter, chicken, meat. He reported intake of pancakes, milk, and coffee for breakfast. No complaints of N/V/C/D; BM 10/17. No pain endorsed. NKFA. No difficulty chewing or swallowing. SKin intact pressure-wise. DM/DASH/gout nutrition education provided and handouts reviewed. Pt receptive and understanding. Will continue to follow per RD protocol.

## 2019-10-18 ENCOUNTER — INBOUND DOCUMENT (OUTPATIENT)
Age: 64
End: 2019-10-18

## 2019-10-18 PROBLEM — Z00.00 ENCOUNTER FOR PREVENTIVE HEALTH EXAMINATION: Status: ACTIVE | Noted: 2019-10-18

## 2019-10-21 ENCOUNTER — APPOINTMENT (OUTPATIENT)
Dept: NEUROLOGY | Facility: CLINIC | Age: 64
End: 2019-10-21
Payer: COMMERCIAL

## 2019-10-21 ENCOUNTER — RX RENEWAL (OUTPATIENT)
Age: 64
End: 2019-10-21

## 2019-10-21 VITALS — DIASTOLIC BLOOD PRESSURE: 111 MMHG | SYSTOLIC BLOOD PRESSURE: 153 MMHG

## 2019-10-21 VITALS — BODY MASS INDEX: 37.81 KG/M2 | HEIGHT: 69 IN | WEIGHT: 255.3 LBS | RESPIRATION RATE: 18 BRPM

## 2019-10-21 VITALS — SYSTOLIC BLOOD PRESSURE: 150 MMHG | HEART RATE: 93 BPM | DIASTOLIC BLOOD PRESSURE: 90 MMHG | OXYGEN SATURATION: 97 %

## 2019-10-21 VITALS — TEMPERATURE: 98.2 F

## 2019-10-21 DIAGNOSIS — I10 ESSENTIAL (PRIMARY) HYPERTENSION: ICD-10-CM

## 2019-10-21 DIAGNOSIS — E78.5 HYPERLIPIDEMIA, UNSPECIFIED: ICD-10-CM

## 2019-10-21 DIAGNOSIS — R26.9 UNSPECIFIED ABNORMALITIES OF GAIT AND MOBILITY: ICD-10-CM

## 2019-10-21 DIAGNOSIS — I63.9 CEREBRAL INFARCTION, UNSPECIFIED: ICD-10-CM

## 2019-10-21 DIAGNOSIS — Z88.0 ALLERGY STATUS TO PENICILLIN: ICD-10-CM

## 2019-10-21 DIAGNOSIS — Z87.891 PERSONAL HISTORY OF NICOTINE DEPENDENCE: ICD-10-CM

## 2019-10-21 DIAGNOSIS — I48.19 OTHER PERSISTENT ATRIAL FIBRILLATION: ICD-10-CM

## 2019-10-21 DIAGNOSIS — B02.9 ZOSTER WITHOUT COMPLICATIONS: ICD-10-CM

## 2019-10-21 DIAGNOSIS — E66.9 OBESITY, UNSPECIFIED: ICD-10-CM

## 2019-10-21 DIAGNOSIS — E11.9 TYPE 2 DIABETES MELLITUS WITHOUT COMPLICATIONS: ICD-10-CM

## 2019-10-21 DIAGNOSIS — M10.9 GOUT, UNSPECIFIED: ICD-10-CM

## 2019-10-21 PROCEDURE — 99215 OFFICE O/P EST HI 40 MIN: CPT

## 2019-10-21 RX ORDER — ALLOPURINOL 100 MG/1
100 TABLET ORAL TWICE DAILY
Qty: 20 | Refills: 0 | Status: ACTIVE | COMMUNITY
Start: 2019-10-21 | End: 1900-01-01

## 2019-11-04 ENCOUNTER — INBOUND DOCUMENT (OUTPATIENT)
Age: 64
End: 2019-11-04

## 2019-11-06 PROBLEM — I63.9 CVA (CEREBRAL VASCULAR ACCIDENT): Status: ACTIVE | Noted: 2019-10-21

## 2024-02-07 NOTE — PATIENT PROFILE ADULT - NSTRANSFERBELONGINGSRESP_GEN_A_NUR
PAST SURGICAL HISTORY:  History of flexible sigmoidoscopy     History of right inguinal hernia repair     S/P cataract surgery b/l 2010    S/P ORIF (open reduction internal fixation) fracture left 1995    S/P tonsillectomy as a child     yes

## 2025-03-10 NOTE — PATIENT PROFILE ADULT - NSPROHMCARDIOMGMTSTRAT_GEN_A_NUR
Addended by: CHANEL ANDRADE on: 3/10/2025 04:01 PM     Modules accepted: Level of Service     medication therapy